# Patient Record
Sex: MALE | Race: WHITE | URBAN - METROPOLITAN AREA
[De-identification: names, ages, dates, MRNs, and addresses within clinical notes are randomized per-mention and may not be internally consistent; named-entity substitution may affect disease eponyms.]

---

## 2017-09-29 ENCOUNTER — APPOINTMENT (OUTPATIENT)
Dept: GENERAL RADIOLOGY | Facility: CLINIC | Age: 67
DRG: 314 | End: 2017-09-29
Attending: EMERGENCY MEDICINE
Payer: MEDICARE

## 2017-09-29 ENCOUNTER — HOSPITAL ENCOUNTER (INPATIENT)
Facility: CLINIC | Age: 67
LOS: 11 days | Discharge: HOME | DRG: 314 | End: 2017-10-10
Attending: EMERGENCY MEDICINE | Admitting: INTERNAL MEDICINE
Payer: MEDICARE

## 2017-09-29 DIAGNOSIS — J15.9 COMMUNITY ACQUIRED BACTERIAL PNEUMONIA: Primary | ICD-10-CM

## 2017-09-29 DIAGNOSIS — I50.21 ACUTE SYSTOLIC CONGESTIVE HEART FAILURE (H): ICD-10-CM

## 2017-09-29 PROBLEM — I50.9 CHF (CONGESTIVE HEART FAILURE) (H): Status: ACTIVE | Noted: 2017-09-29

## 2017-09-29 LAB
ABO + RH BLD: NORMAL
ABO + RH BLD: NORMAL
ALBUMIN SERPL-MCNC: 2.6 G/DL (ref 3.4–5)
ALP SERPL-CCNC: 124 U/L (ref 40–150)
ALT SERPL W P-5'-P-CCNC: 28 U/L (ref 0–70)
ANION GAP SERPL CALCULATED.3IONS-SCNC: 5 MMOL/L (ref 3–14)
AST SERPL W P-5'-P-CCNC: 26 U/L (ref 0–45)
BASE EXCESS BLDV CALC-SCNC: 6.6 MMOL/L
BASOPHILS # BLD AUTO: 0.1 10E9/L (ref 0–0.2)
BASOPHILS NFR BLD AUTO: 0.8 %
BILIRUB SERPL-MCNC: 0.2 MG/DL (ref 0.2–1.3)
BLD GP AB SCN SERPL QL: NORMAL
BLOOD BANK CMNT PATIENT-IMP: NORMAL
BUN SERPL-MCNC: 26 MG/DL (ref 7–30)
CALCIUM SERPL-MCNC: 8.1 MG/DL (ref 8.5–10.1)
CHLORIDE SERPL-SCNC: 106 MMOL/L (ref 94–109)
CO2 SERPL-SCNC: 31 MMOL/L (ref 20–32)
CREAT SERPL-MCNC: 0.93 MG/DL (ref 0.66–1.25)
DIFFERENTIAL METHOD BLD: ABNORMAL
EOSINOPHIL # BLD AUTO: 0.1 10E9/L (ref 0–0.7)
EOSINOPHIL NFR BLD AUTO: 1.1 %
ERYTHROCYTE [DISTWIDTH] IN BLOOD BY AUTOMATED COUNT: 18.1 % (ref 10–15)
GFR SERPL CREATININE-BSD FRML MDRD: 81 ML/MIN/1.7M2
GLUCOSE SERPL-MCNC: 90 MG/DL (ref 70–99)
HCO3 BLDV-SCNC: 33 MMOL/L (ref 21–28)
HCT VFR BLD AUTO: 34.4 % (ref 40–53)
HGB BLD-MCNC: 10.9 G/DL (ref 13.3–17.7)
IMM GRANULOCYTES # BLD: 0.2 10E9/L (ref 0–0.4)
IMM GRANULOCYTES NFR BLD: 1.7 %
LYMPHOCYTES # BLD AUTO: 1.2 10E9/L (ref 0.8–5.3)
LYMPHOCYTES NFR BLD AUTO: 11.8 %
MCH RBC QN AUTO: 30.5 PG (ref 26.5–33)
MCHC RBC AUTO-ENTMCNC: 31.7 G/DL (ref 31.5–36.5)
MCV RBC AUTO: 96 FL (ref 78–100)
MONOCYTES # BLD AUTO: 0.8 10E9/L (ref 0–1.3)
MONOCYTES NFR BLD AUTO: 8.1 %
NEUTROPHILS # BLD AUTO: 7.7 10E9/L (ref 1.6–8.3)
NEUTROPHILS NFR BLD AUTO: 76.5 %
NRBC # BLD AUTO: 0 10*3/UL
NRBC BLD AUTO-RTO: 0 /100
NT-PROBNP SERPL-MCNC: 1929 PG/ML (ref 0–900)
O2/TOTAL GAS SETTING VFR VENT: ABNORMAL %
PCO2 BLDV: 52 MM HG (ref 40–50)
PH BLDV: 7.4 PH (ref 7.32–7.43)
PLATELET # BLD AUTO: 266 10E9/L (ref 150–450)
PO2 BLDV: 25 MM HG (ref 25–47)
POTASSIUM SERPL-SCNC: 4.5 MMOL/L (ref 3.4–5.3)
PROT SERPL-MCNC: 7.5 G/DL (ref 6.8–8.8)
RBC # BLD AUTO: 3.57 10E12/L (ref 4.4–5.9)
SODIUM SERPL-SCNC: 142 MMOL/L (ref 133–144)
SPECIMEN EXP DATE BLD: NORMAL
TROPONIN I SERPL-MCNC: 0.12 UG/L (ref 0–0.04)
WBC # BLD AUTO: 10 10E9/L (ref 4–11)

## 2017-09-29 PROCEDURE — 99285 EMERGENCY DEPT VISIT HI MDM: CPT | Mod: 25

## 2017-09-29 PROCEDURE — 84484 ASSAY OF TROPONIN QUANT: CPT | Performed by: EMERGENCY MEDICINE

## 2017-09-29 PROCEDURE — 85025 COMPLETE CBC W/AUTO DIFF WBC: CPT | Performed by: EMERGENCY MEDICINE

## 2017-09-29 PROCEDURE — 71010 XR CHEST PORT 1 VW: CPT

## 2017-09-29 PROCEDURE — 93005 ELECTROCARDIOGRAM TRACING: CPT

## 2017-09-29 PROCEDURE — 80053 COMPREHEN METABOLIC PANEL: CPT | Performed by: EMERGENCY MEDICINE

## 2017-09-29 PROCEDURE — 82803 BLOOD GASES ANY COMBINATION: CPT | Performed by: EMERGENCY MEDICINE

## 2017-09-29 PROCEDURE — 86900 BLOOD TYPING SEROLOGIC ABO: CPT | Performed by: EMERGENCY MEDICINE

## 2017-09-29 PROCEDURE — 12000007 ZZH R&B INTERMEDIATE

## 2017-09-29 PROCEDURE — 99223 1ST HOSP IP/OBS HIGH 75: CPT | Mod: AI | Performed by: INTERNAL MEDICINE

## 2017-09-29 PROCEDURE — 83880 ASSAY OF NATRIURETIC PEPTIDE: CPT | Performed by: EMERGENCY MEDICINE

## 2017-09-29 PROCEDURE — 40000275 ZZH STATISTIC RCP TIME EA 10 MIN

## 2017-09-29 PROCEDURE — 86901 BLOOD TYPING SEROLOGIC RH(D): CPT | Performed by: EMERGENCY MEDICINE

## 2017-09-29 PROCEDURE — 86850 RBC ANTIBODY SCREEN: CPT | Performed by: EMERGENCY MEDICINE

## 2017-09-29 PROCEDURE — 85379 FIBRIN DEGRADATION QUANT: CPT | Performed by: EMERGENCY MEDICINE

## 2017-09-29 RX ORDER — CETIRIZINE HYDROCHLORIDE 10 MG/1
10 TABLET ORAL DAILY
COMMUNITY

## 2017-09-29 RX ORDER — ONDANSETRON 4 MG/1
4 TABLET, ORALLY DISINTEGRATING ORAL EVERY 6 HOURS PRN
Status: DISCONTINUED | OUTPATIENT
Start: 2017-09-29 | End: 2017-10-10 | Stop reason: HOSPADM

## 2017-09-29 RX ORDER — ALLOPURINOL 100 MG/1
100 TABLET ORAL 2 TIMES DAILY
COMMUNITY

## 2017-09-29 RX ORDER — ONDANSETRON 2 MG/ML
4 INJECTION INTRAMUSCULAR; INTRAVENOUS EVERY 6 HOURS PRN
Status: DISCONTINUED | OUTPATIENT
Start: 2017-09-29 | End: 2017-10-10 | Stop reason: HOSPADM

## 2017-09-29 RX ORDER — ACETAMINOPHEN 325 MG/1
650 TABLET ORAL EVERY 4 HOURS PRN
Status: DISCONTINUED | OUTPATIENT
Start: 2017-09-29 | End: 2017-10-10 | Stop reason: HOSPADM

## 2017-09-29 RX ORDER — ASPIRIN 325 MG
325 TABLET ORAL DAILY
Status: DISCONTINUED | OUTPATIENT
Start: 2017-09-30 | End: 2017-10-10 | Stop reason: HOSPADM

## 2017-09-29 RX ORDER — FUROSEMIDE 40 MG
40 TABLET ORAL EVERY MORNING
COMMUNITY

## 2017-09-29 RX ORDER — EMOLLIENT BASE
CREAM (GRAM) TOPICAL 2 TIMES DAILY
COMMUNITY

## 2017-09-29 RX ORDER — FUROSEMIDE 20 MG
20 TABLET ORAL
COMMUNITY

## 2017-09-29 RX ORDER — POTASSIUM CHLORIDE 750 MG/1
10 TABLET, EXTENDED RELEASE ORAL DAILY
COMMUNITY

## 2017-09-29 RX ORDER — AMOXICILLIN 250 MG
1-2 CAPSULE ORAL 2 TIMES DAILY
Status: DISCONTINUED | OUTPATIENT
Start: 2017-09-30 | End: 2017-10-10 | Stop reason: HOSPADM

## 2017-09-29 RX ORDER — NALOXONE HYDROCHLORIDE 0.4 MG/ML
.1-.4 INJECTION, SOLUTION INTRAMUSCULAR; INTRAVENOUS; SUBCUTANEOUS
Status: DISCONTINUED | OUTPATIENT
Start: 2017-09-29 | End: 2017-10-10 | Stop reason: HOSPADM

## 2017-09-29 RX ORDER — FUROSEMIDE 10 MG/ML
40 INJECTION INTRAMUSCULAR; INTRAVENOUS EVERY 8 HOURS
Status: DISCONTINUED | OUTPATIENT
Start: 2017-09-30 | End: 2017-09-30

## 2017-09-29 RX ORDER — MULTIVITAMIN,THERAPEUTIC
1 TABLET ORAL DAILY
COMMUNITY

## 2017-09-29 RX ORDER — ACETIC ACID 20.65 MG/ML
5 SOLUTION AURICULAR (OTIC) WEEKLY
COMMUNITY

## 2017-09-29 RX ORDER — LEVOTHYROXINE SODIUM 50 UG/1
50 TABLET ORAL DAILY
COMMUNITY

## 2017-09-29 RX ORDER — ACETAMINOPHEN 500 MG
1000 TABLET ORAL 2 TIMES DAILY
COMMUNITY

## 2017-09-29 RX ORDER — POLYETHYLENE GLYCOL 3350 17 G/17G
17 POWDER, FOR SOLUTION ORAL DAILY PRN
Status: DISCONTINUED | OUTPATIENT
Start: 2017-09-29 | End: 2017-10-10 | Stop reason: HOSPADM

## 2017-09-29 ASSESSMENT — ENCOUNTER SYMPTOMS: SHORTNESS OF BREATH: 1

## 2017-09-29 NOTE — LETTER
"Transition Communication Hand-off for Care Transitions to Next Level of Care Provider    Name: Jerald Alonzo  MRN #: [id number]  Primary Care Provider: Orestes Werner  Primary Care MD Name: Dr. Corbin Phelps  Primary Clinic: No address on file  Primary Care Clinic Name: [facility]  Reason for Hospitalization:  Acute systolic congestive heart failure (H) [I50.21]  Admit Date/Time: 9/29/2017  7:51 PM  Discharge Date: ***  Payor Source: Payor: MEDICARE / Plan: MEDICARE / Product Type: Medicare /     Readmission Assessment Measure (QUYEN) Risk Score/category: ***    Plan of Care Goals/Milestone Events:   Patient Concerns: ***   Patient Goals:   Short-term ***   Long-term ***   Medical Goals   Short-term ***   Long-term ***         Reason for Communication Hand-off Referral: {CCREASONCMCTNHANDOFFREFERRALCTS:55017}    Discharge Plan:       Concern for non-adherence with plan of care:   Y/N ***  Discharge Needs Assessment:  Needs       Most Recent Value    Equipment Currently Used at Home walker, rolling    # of Referrals Placed by [facility] External Care Coordination [Discussed CHF Action Plan with sister, Shayna Anand. ]          Already enrolled in Tele-monitoring program and name of program:  ***  Follow-up specialty is recommended: {YES / NO:85616::\"Yes\"}    Follow-up plan:  No future appointments.    Any outstanding tests or procedures:              Key Recommendations: Key Recommendations: Monitor sodium intake. Weigh daily as able; maintain exercise/dancing as able.     Shiloh Guerrero    AVS/Discharge Summary is the source of truth; this is a helpful guide for improved communication of patient story          "

## 2017-09-29 NOTE — IP AVS SNAPSHOT
MRN:[id number]                      After Visit Summary   9/29/2017    Jerald Alonzo    MRN: [id number]           Thank you!     Thank you for choosing [facility] for your care. Our goal is always to provide you with excellent care. Hearing back from our patients is one way we can continue to improve our services. Please take a few minutes to complete the written survey that you may receive in the mail after you visit. If you would like to speak to someone directly about your visit please contact Patient Relations at [phone]. Thank you!          Patient Information     Date Of Birth          1950        Designated Caregiver       Most Recent Value    Caregiver    Will someone help with your care after discharge? yes    Name of designated caregiver Jakob     Phone number of caregiver [phone]    Caregiver address caregiver at pt home       About your hospital stay     You were admitted on:  September 29, 2017 You last received care in the:  [facility]    You were discharged on:  October 10, 2017        Reason for your hospital stay       pneumonia                  Who to Call     For medical emergencies, please call 911.  For non-urgent questions about your medical care, please call your primary care provider or clinic, None          Attending Provider     Provider Specialty    Armand Pollock MD Emergency Medicine    Jareth Andrew MD Internal Medicine       Primary Care Provider    Orestes Werner      After Care Instructions     Activity       Your activity upon discharge: activity as tolerated            Diet       Follow this diet upon discharge: Orders Placed This Encounter      Room Service      Combination Diet Low Saturated Fat Na <2400mg Diet, No Caffeine Diet                  Follow-up Appointments     Follow-up and recommended labs and tests        Follow up with primary care provider, Orestes Werner, within 7  "days                  Pending Results     No orders found from 2017 to 2017.            Statement of Approval     Ordered          10/10/17 0835  I have reviewed and agree with all the recommendations and orders detailed in this document.  EFFECTIVE NOW     Approved and electronically signed by:  Jany Mills MD             Admission Information     Date & Time Department Dept. Phone    [address] [place] [address] Medical Surgical [phone]      Your Vitals Were     Blood Pressure Pulse Temperature Respirations Height Weight    104/53 (BP Location: Left arm) 71 97.4  F (36.3  C) (Axillary) 18 1.473 m (4' 9.99\") 82 kg (180 lb 11.2 oz)    Pulse Oximetry BMI (Body Mass Index)                94% 37.78 kg/m2          MyChart Information     [facility] lets you send messages to your doctor, view your test results, renew your prescriptions, schedule appointments and more. To sign up, go to [email or web address] . Click on \"Log in\" on the left side of the screen, which will take you to the Welcome page. Then click on \"Sign up Now\" on the right side of the page.     You will be asked to enter the access code listed below, as well as some personal information. Please follow the directions to create your username and password.     Your access code is: [email or web address]  Expires: 2018 12:26 PM     Your access code will  in 90 days. If you need help or a new code, please call your [facility] clinic or [phone].        Care EveryWhere ID     This is your Care EveryWhere ID. This could be used by other organizations to access your [facility] medical records  [id number]        Equal Access to Services     [facility] Hospital[facility] : Hadii amelia Gonzáles, elizabet bains, aron leroy . So [facility] [phone].    ATENCIÓN: Si habla español, tiene a [place] disposición servicios gratuitos de asistencia lingüística. Llame al [phone].    We comply with " applicable federal civil rights laws and [place] laws. We do not discriminate on the basis of race, color, national origin, age, disability, sex, sexual orientation, or gender identity.               Review of your medicines      CONTINUE these medicines which have NOT CHANGED        Dose / Directions    * acetaminophen 500 MG Caps        w tablets orall as needed for pain maximum 4000mg in 24 hours   Refills:  0       * acetaminophen 500 MG tablet   Commonly known as:  TYLENOL        Dose:  1000 mg   Take 1,000 mg by mouth 2 times daily   Refills:  0       acetic acid 2 % otic solution   Commonly known as:  VOSOL        Dose:  5 drop   Place 5 drops into both ears once a week On Thursday   Refills:  0       allopurinol 100 MG tablet   Commonly known as:  ZYLOPRIM        Dose:  100 mg   Take 100 mg by mouth 2 times daily   Refills:  0       amoxicillin 500 MG tablet   Commonly known as:  AMOXIL        take 4 capsules by mouth 1 hour before dental appointments( 2000mg)   Refills:  0       cetirizine 10 MG tablet   Commonly known as:  zyrTEC        Dose:  10 mg   Take 10 mg by mouth daily   Refills:  0       cholecalciferol 400 UNIT Tabs tablet   Commonly known as:  vitamin D        Dose:  800 Units   Take 800 Units by mouth daily   Refills:  0       COLACE 100 MG capsule   Generic drug:  docusate sodium        Dose:  200 mg   Take 200 mg by mouth every morning   Refills:  0       emollient cream        Apply topically 2 times daily   Refills:  0       * LASIX 20 MG tablet   Generic drug:  furosemide        Dose:  20 mg   Take 20 mg by mouth In afternoon   Refills:  0       * LASIX 40 MG tablet   Generic drug:  furosemide        Dose:  40 mg   Take 40 mg by mouth every morning   Refills:  0       levofloxacin 500 MG tablet   Commonly known as:  LEVAQUIN   Indication:  Community Acquired Pneumonia   Used for:  Community acquired bacterial pneumonia        Dose:  500 mg   Take 1 tablet (500 mg) by mouth daily for 2  days   Quantity:  2 tablet   Refills:  0       levothyroxine 50 MCG tablet   Commonly known as:  SYNTHROID/LEVOTHROID        Dose:  50 mcg   Take 50 mcg by mouth daily   Refills:  0       minocycline 100 MG capsule   Commonly known as:  MINOCIN/DYNACIN        Dose:  100 mg   Take 100 mg by mouth 2 times daily.   Refills:  0       multivitamin, therapeutic Tabs tablet        Dose:  1 tablet   Take 1 tablet by mouth daily   Refills:  0       nexIUM 40 MG CR capsule   Generic drug:  esomeprazole        1 CAPSULE DAILY   Refills:  0       potassium chloride SA 10 MEQ CR tablet   Commonly known as:  K-DUR/KLOR-CON M        Dose:  10 mEq   Take 10 mEq by mouth daily   Refills:  0       tamsulosin 0.4 MG capsule   Commonly known as:  FLOMAX        Dose:  0.4 mg   Take 0.4 mg by mouth daily.   Refills:  0       TEA TREE OIL        apply thin layer to toenails as needed   Refills:  0       * Notice:  This list has 4 medication(s) that are the same as other medications prescribed for you. Read the directions carefully, and ask your doctor or other care provider to review them with you.         Where to get your medicines      These medications were sent to [facility] - [place], [place] - [id number] [place] [address] [address]  [address] [facility] [address]     Phone:  [phone]     levofloxacin 500 MG tablet               ANTIBIOTIC INSTRUCTION     You've Been Prescribed an Antibiotic - Now What?  Your healthcare team thinks that you or your loved one might have an infection. Some infections can be treated with antibiotics, which are powerful, life-saving drugs. Like all medications, antibiotics have side effects and should only be used when necessary. There are some important things you should know about your antibiotic treatment.      Your healthcare team may run tests before you start taking an antibiotic.    Your team may take samples (e.g., from your blood, urine or other areas) to run tests to look for  bacteria. These test can be important to determine if you need an antibiotic at all and, if you do, which antibiotic will work best.      Within a few days, your healthcare team might change or even stop your antibiotic.    Your team may start you on an antibiotic while they are working to find out what is making you sick.    Your team might change your antibiotic because test results show that a different antibiotic would be better to treat your infection.    In some cases, once your team has more information, they learn that you do not need an antibiotic at all. They may find out that you don't have an infection, or that the antibiotic you're taking won't work against your infection. For example, an infection caused by a virus can't be treated with antibiotics. Staying on an antibiotic when you don't need it is more likely to be harmful than helpful.      You may experience side effects from your antibiotic.    Like all medications, antibiotics have side effects. Some of these can be serious.    Let you healthcare team know if you have any known allergies when you are admitted to the hospital.    One significant side effect of nearly all antibiotics is the risk of severe and sometimes deadly diarrhea caused by Clostridium difficile (C. Difficile). This occurs when a person takes antibiotics because some good germs are destroyed. Antibiotic use allows C. diificile to take over, putting patients at high risk for this serious infection.    As a patient or caregiver, it is important to understand your or your loved one's antibiotic treatment. It is especially important for caregivers to speak up when patients can't speak for themselves. Here are some important questions to ask your healthcare team.    What infection is this antibiotic treating and how do you know I have that infection?    What side effects might occur from this antibiotic?    How long will I need to take this antibiotic?    Is it safe to take this  antibiotic with other medications or supplements (e.g., vitamins) that I am taking?     Are there any special directions I need to know about taking this antibiotic? For example, should I take it with food?    How will I be monitored to know whether my infection is responding to the antibiotic?    What tests may help to make sure the right antibiotic is prescribed for me?      Information provided by:  www.cdc.gov/getsmart  U.S. Department of Health and Human Services  Centers for disease Control and Prevention  National Center for Emerging and Zoonotic Infectious Diseases  Division of Healthcare Quality Promotion         Protect others around you: Learn how to safely use, store and throw away your medicines at www.disposemymeds.org.             Medication List: This is a list of all your medications and when to take them. Check marks below indicate your daily home schedule. Keep this list as a reference.      Medications           Morning Afternoon Evening Bedtime As Needed    * acetaminophen 500 MG Caps   w tablets orall as needed for pain maximum 4000mg in 24 hours                                   * acetaminophen 500 MG tablet   Commonly known as:  TYLENOL   Take 1,000 mg by mouth 2 times daily   Last time this was given:  1,000 mg on 10/10/2017  9:09 AM                                      acetic acid 2 % otic solution   Commonly known as:  VOSOL   Place 5 drops into both ears once a week On Thursday                                allopurinol 100 MG tablet   Commonly known as:  ZYLOPRIM   Take 100 mg by mouth 2 times daily   Last time this was given:  100 mg on 10/10/2017  9:09 AM                                      amoxicillin 500 MG tablet   Commonly known as:  AMOXIL   take 4 capsules by mouth 1 hour before dental appointments( 2000mg)                                cetirizine 10 MG tablet   Commonly known as:  zyrTEC   Take 10 mg by mouth daily   Last time this was given:  10 mg on 10/10/2017  9:08 AM                                    cholecalciferol 400 UNIT Tabs tablet   Commonly known as:  vitamin D   Take 800 Units by mouth daily                                   COLACE 100 MG capsule   Take 200 mg by mouth every morning   Last time this was given:  200 mg on 10/10/2017  9:08 AM   Generic drug:  docusate sodium                                   emollient cream   Apply topically 2 times daily                                      * LASIX 20 MG tablet   Take 20 mg by mouth In afternoon   Last time this was given:  40 mg on 10/10/2017  9:10 AM   Generic drug:  furosemide                                   * LASIX 40 MG tablet   Take 40 mg by mouth every morning   Last time this was given:  40 mg on 10/10/2017  9:10 AM   Generic drug:  furosemide                                   levofloxacin 500 MG tablet   Commonly known as:  LEVAQUIN   Take 1 tablet (500 mg) by mouth daily for 2 days   Last time this was given:  500 mg on 10/10/2017  9:07 AM                                   levothyroxine 50 MCG tablet   Commonly known as:  SYNTHROID/LEVOTHROID   Take 50 mcg by mouth daily   Last time this was given:  50 mcg on 10/10/2017  9:07 AM                                   minocycline 100 MG capsule   Commonly known as:  MINOCIN/DYNACIN   Take 100 mg by mouth 2 times daily.   Last time this was given:  100 mg on 10/10/2017  9:07 AM                                      multivitamin, therapeutic Tabs tablet   Take 1 tablet by mouth daily                                   nexIUM 40 MG CR capsule   1 CAPSULE DAILY   Generic drug:  esomeprazole                                   potassium chloride SA 10 MEQ CR tablet   Commonly known as:  K-DUR/KLOR-CON M   Take 10 mEq by mouth daily                                   tamsulosin 0.4 MG capsule   Commonly known as:  FLOMAX   Take 0.4 mg by mouth daily.   Last time this was given:  0.4 mg on 10/9/2017  8:38 PM                                   TEA TREE OIL   apply thin layer to  toenails as needed                                   * Notice:  This list has 4 medication(s) that are the same as other medications prescribed for you. Read the directions carefully, and ask your doctor or other care provider to review them with you.

## 2017-09-29 NOTE — IP AVS SNAPSHOT
[place] [address] [facility] Surgical    [address]    [facility] [address]    Phone:  [phone]    Fax:  [phone]                                       After Visit Summary   9/29/2017    Jerald Alonzo    MRN: [id number]           After Visit Summary Signature Page     I have received my discharge instructions, and my questions have been answered. I have discussed any challenges I see with this plan with the nurse or doctor.    ..........................................................................................................................................  Patient/Patient Representative Signature      ..........................................................................................................................................  Patient Representative Print Name and Relationship to Patient    ..................................................               ................................................  Date                                            Time    ..........................................................................................................................................  Reviewed by Signature/Title    ...................................................              ..............................................  Date                                                            Time

## 2017-09-29 NOTE — LETTER
Hand-off for Care Transitions to Next Level of Care Provider  Name: Jerald Alonzo  MRN #: [id number]  Reason for Hospitalization:  Acute systolic congestive heart failure (H) [I50.21]  Admit Date/Time: 9/29/2017  7:51 PM  Discharge Date: 10/10     Reason for Communication Hand-off Referral: Admission diagnoses: CHF     Discharge Plan:  Discharged to: Home with homecare                       Key Recommendations:  Pt was admitted with SOB, CHF and severe pulmonary HTN and untreated FROYLAN. He was treated with diuretics although his severe pulmonary HTN was felt to have contributed to his hypoxia. He was treated also with O2, nebs and a trial of steroids. He developed PNA and was on IV abx. Palliative care was involved with his 5 guardians. He was dc'd back to his group home with plans for pt to start hospice.      He has a new POLST given to GH and guardians.     Shiloh Guerrero RN CTS     Handoff was sent via [employer] to cts for pcp

## 2017-09-29 NOTE — Clinical Note
Admitting Physician: BENJAMIN BLAKELY [[id number]]   [id number]   Bed Type: Cardiac Telemetry [44]   Special needs: Confused [4]   Bed request comments: NEEDS LIFT ROOM - BED REQUEST@[id number]

## 2017-09-30 ENCOUNTER — APPOINTMENT (OUTPATIENT)
Dept: ULTRASOUND IMAGING | Facility: CLINIC | Age: 67
DRG: 314 | End: 2017-09-30
Attending: INTERNAL MEDICINE
Payer: MEDICARE

## 2017-09-30 ENCOUNTER — APPOINTMENT (OUTPATIENT)
Dept: CARDIOLOGY | Facility: CLINIC | Age: 67
DRG: 314 | End: 2017-09-30
Attending: INTERNAL MEDICINE
Payer: MEDICARE

## 2017-09-30 LAB
ANION GAP SERPL CALCULATED.3IONS-SCNC: 4 MMOL/L (ref 3–14)
BASOPHILS # BLD AUTO: 0.1 10E9/L (ref 0–0.2)
BASOPHILS NFR BLD AUTO: 1 %
BUN SERPL-MCNC: 25 MG/DL (ref 7–30)
CALCIUM SERPL-MCNC: 7.8 MG/DL (ref 8.5–10.1)
CHLORIDE SERPL-SCNC: 107 MMOL/L (ref 94–109)
CO2 SERPL-SCNC: 31 MMOL/L (ref 20–32)
CREAT SERPL-MCNC: 0.94 MG/DL (ref 0.66–1.25)
D DIMER PPP FEU-MCNC: 1.1 UG/ML FEU (ref 0–0.5)
DIFFERENTIAL METHOD BLD: ABNORMAL
EOSINOPHIL # BLD AUTO: 0.2 10E9/L (ref 0–0.7)
EOSINOPHIL NFR BLD AUTO: 1.8 %
ERYTHROCYTE [DISTWIDTH] IN BLOOD BY AUTOMATED COUNT: 18.2 % (ref 10–15)
GFR SERPL CREATININE-BSD FRML MDRD: 81 ML/MIN/1.7M2
GLUCOSE SERPL-MCNC: 78 MG/DL (ref 70–99)
HCT VFR BLD AUTO: 32.8 % (ref 40–53)
HGB BLD-MCNC: 10 G/DL (ref 13.3–17.7)
IMM GRANULOCYTES # BLD: 0.2 10E9/L (ref 0–0.4)
IMM GRANULOCYTES NFR BLD: 1.8 %
INTERPRETATION ECG - MUSE: NORMAL
LYMPHOCYTES # BLD AUTO: 1.1 10E9/L (ref 0.8–5.3)
LYMPHOCYTES NFR BLD AUTO: 13 %
MCH RBC QN AUTO: 30.4 PG (ref 26.5–33)
MCHC RBC AUTO-ENTMCNC: 30.5 G/DL (ref 31.5–36.5)
MCV RBC AUTO: 100 FL (ref 78–100)
MONOCYTES # BLD AUTO: 0.9 10E9/L (ref 0–1.3)
MONOCYTES NFR BLD AUTO: 9.9 %
NEUTROPHILS # BLD AUTO: 6.3 10E9/L (ref 1.6–8.3)
NEUTROPHILS NFR BLD AUTO: 72.5 %
NRBC # BLD AUTO: 0 10*3/UL
NRBC BLD AUTO-RTO: 0 /100
PLATELET # BLD AUTO: 246 10E9/L (ref 150–450)
POTASSIUM SERPL-SCNC: 4.8 MMOL/L (ref 3.4–5.3)
RBC # BLD AUTO: 3.29 10E12/L (ref 4.4–5.9)
SODIUM SERPL-SCNC: 142 MMOL/L (ref 133–144)
TROPONIN I SERPL-MCNC: 0.05 UG/L (ref 0–0.04)
TROPONIN I SERPL-MCNC: 0.09 UG/L (ref 0–0.04)
WBC # BLD AUTO: 8.7 10E9/L (ref 4–11)

## 2017-09-30 PROCEDURE — 25000132 ZZH RX MED GY IP 250 OP 250 PS 637: Mod: GY | Performed by: INTERNAL MEDICINE

## 2017-09-30 PROCEDURE — 84484 ASSAY OF TROPONIN QUANT: CPT | Performed by: INTERNAL MEDICINE

## 2017-09-30 PROCEDURE — 80048 BASIC METABOLIC PNL TOTAL CA: CPT | Performed by: INTERNAL MEDICINE

## 2017-09-30 PROCEDURE — 85025 COMPLETE CBC W/AUTO DIFF WBC: CPT | Performed by: INTERNAL MEDICINE

## 2017-09-30 PROCEDURE — 12000007 ZZH R&B INTERMEDIATE

## 2017-09-30 PROCEDURE — 99233 SBSQ HOSP IP/OBS HIGH 50: CPT | Performed by: INTERNAL MEDICINE

## 2017-09-30 PROCEDURE — 93306 TTE W/DOPPLER COMPLETE: CPT | Mod: 26 | Performed by: INTERNAL MEDICINE

## 2017-09-30 PROCEDURE — 36415 COLL VENOUS BLD VENIPUNCTURE: CPT | Performed by: INTERNAL MEDICINE

## 2017-09-30 PROCEDURE — 40000915 ZZH STATISTIC SITTER, EVENING HOURS

## 2017-09-30 PROCEDURE — 40000264 ECHO COMPLETE WITH LUMASON

## 2017-09-30 PROCEDURE — 93970 EXTREMITY STUDY: CPT

## 2017-09-30 PROCEDURE — 40000916 ZZH STATISTIC SITTER, NIGHT HOURS

## 2017-09-30 PROCEDURE — 25000128 H RX IP 250 OP 636: Performed by: INTERNAL MEDICINE

## 2017-09-30 PROCEDURE — 25500064 ZZH RX 255 OP 636: Performed by: INTERNAL MEDICINE

## 2017-09-30 PROCEDURE — A9270 NON-COVERED ITEM OR SERVICE: HCPCS | Mod: GY | Performed by: INTERNAL MEDICINE

## 2017-09-30 RX ORDER — POTASSIUM CHLORIDE 750 MG/1
10 TABLET, EXTENDED RELEASE ORAL DAILY
Status: DISCONTINUED | OUTPATIENT
Start: 2017-09-30 | End: 2017-10-10 | Stop reason: HOSPADM

## 2017-09-30 RX ORDER — TAMSULOSIN HYDROCHLORIDE 0.4 MG/1
0.4 CAPSULE ORAL EVERY EVENING
Status: DISCONTINUED | OUTPATIENT
Start: 2017-09-30 | End: 2017-10-10 | Stop reason: HOSPADM

## 2017-09-30 RX ORDER — PANTOPRAZOLE SODIUM 40 MG/1
40 TABLET, DELAYED RELEASE ORAL
Status: DISCONTINUED | OUTPATIENT
Start: 2017-09-30 | End: 2017-10-10 | Stop reason: HOSPADM

## 2017-09-30 RX ORDER — FUROSEMIDE 10 MG/ML
40 INJECTION INTRAMUSCULAR; INTRAVENOUS
Status: DISCONTINUED | OUTPATIENT
Start: 2017-10-01 | End: 2017-10-01

## 2017-09-30 RX ORDER — ACETAMINOPHEN 500 MG
1000 TABLET ORAL 2 TIMES DAILY
Status: DISCONTINUED | OUTPATIENT
Start: 2017-09-30 | End: 2017-10-10 | Stop reason: HOSPADM

## 2017-09-30 RX ORDER — CETIRIZINE HYDROCHLORIDE 10 MG/1
10 TABLET ORAL DAILY
Status: DISCONTINUED | OUTPATIENT
Start: 2017-09-30 | End: 2017-10-10 | Stop reason: HOSPADM

## 2017-09-30 RX ORDER — ALLOPURINOL 100 MG/1
100 TABLET ORAL 2 TIMES DAILY
Status: DISCONTINUED | OUTPATIENT
Start: 2017-09-30 | End: 2017-10-10 | Stop reason: HOSPADM

## 2017-09-30 RX ORDER — MINOCYCLINE HYDROCHLORIDE 100 MG/1
100 CAPSULE ORAL 2 TIMES DAILY
Status: DISCONTINUED | OUTPATIENT
Start: 2017-09-30 | End: 2017-10-10 | Stop reason: HOSPADM

## 2017-09-30 RX ORDER — LEVOTHYROXINE SODIUM 25 UG/1
50 TABLET ORAL DAILY
Status: DISCONTINUED | OUTPATIENT
Start: 2017-09-30 | End: 2017-10-10 | Stop reason: HOSPADM

## 2017-09-30 RX ORDER — DOCUSATE SODIUM 100 MG/1
200 CAPSULE, LIQUID FILLED ORAL EVERY MORNING
Status: DISCONTINUED | OUTPATIENT
Start: 2017-09-30 | End: 2017-10-10 | Stop reason: HOSPADM

## 2017-09-30 RX ADMIN — FUROSEMIDE 40 MG: 10 INJECTION, SOLUTION INTRAVENOUS at 01:55

## 2017-09-30 RX ADMIN — ACETAMINOPHEN 1000 MG: 500 TABLET, FILM COATED ORAL at 08:34

## 2017-09-30 RX ADMIN — LEVOTHYROXINE SODIUM 50 MCG: 25 TABLET ORAL at 08:33

## 2017-09-30 RX ADMIN — SULFUR HEXAFLUORIDE 3 ML: KIT at 09:45

## 2017-09-30 RX ADMIN — ASPIRIN 325 MG ORAL TABLET 325 MG: 325 PILL ORAL at 08:34

## 2017-09-30 RX ADMIN — ALLOPURINOL 100 MG: 100 TABLET ORAL at 08:34

## 2017-09-30 RX ADMIN — MINOCYCLINE HYDROCHLORIDE 100 MG: 100 CAPSULE ORAL at 21:43

## 2017-09-30 RX ADMIN — POTASSIUM CHLORIDE 10 MEQ: 750 TABLET, FILM COATED, EXTENDED RELEASE ORAL at 08:34

## 2017-09-30 RX ADMIN — ACETAMINOPHEN 1000 MG: 500 TABLET, FILM COATED ORAL at 02:50

## 2017-09-30 RX ADMIN — ACETAMINOPHEN 500 MG: 500 TABLET, FILM COATED ORAL at 21:44

## 2017-09-30 RX ADMIN — MINOCYCLINE HYDROCHLORIDE 100 MG: 100 CAPSULE ORAL at 10:08

## 2017-09-30 RX ADMIN — FUROSEMIDE 40 MG: 10 INJECTION, SOLUTION INTRAVENOUS at 10:08

## 2017-09-30 RX ADMIN — PANTOPRAZOLE SODIUM 40 MG: 40 TABLET, DELAYED RELEASE ORAL at 07:00

## 2017-09-30 RX ADMIN — CETIRIZINE HYDROCHLORIDE 10 MG: 10 TABLET, FILM COATED ORAL at 08:32

## 2017-09-30 NOTE — H&P
CHIEF COMPLAINT:  Shortness of breath, hypoxia.      HISTORY OF PRESENT ILLNESS:  The patient Jerald Alonzo is a 66-year-old male with Down syndrome, cognitive dysfunction and nonverbal status.  History is obtained from the chart, Dr. Pollock from the Emergency Department with whom I spoke and a Group Home Staff Member who is here with the patient and knows him well.  Reportedly the patient has been having increasing work of breathing for the past 1 week.  He has chronic lower extremity edema which has been treated with Ace wraps.  He resides in a house with his brother who also has cognitive dysfunction.  Apparently they have a contract with a Group Home, and a Group Home Staff Member stays at the house with them 24 hours a day.  At baseline the patient needs a regular diet.  He has no history of aspiration or choking.  He walks with a walker in the home and uses a wheelchair in public.  He is minimally nonverbal.  He will follow commands if the understands what is being said to him.      In addition to increased work of breathing the Group Home Staff reports that the patient has had decreased oral intake and has appears tired lately.  He also had a loose bowel movement today.  Staff reports no other concerning symptoms.  They have attempted to arrange a sleep study for the patient which is upcoming as sleep apnea is suspected.  The patient apparently has been sleeping upright for almost a decade.      On arrival in the Emergency Department this evening vital signs included a blood pressure 145/55 with a heart rate of 90, temperature 99.3 degrees and O2 saturations 94% on room air.  The patient would not wear a facemask in the Emergency Department, so an Emergency Department staffer is currently holding an oxygen tubing for blow-by oxygen.      EMERGENCY DEPARTMENT LABORATORY AND IMAGING RESULTS of basic laboratory studies and imaging studies:   1.  Hemoglobin 11, white cell count and platelet count normal.      2.  Troponin 0.115.     3.  ProBNP 1,900.     4.  Complete metabolic panel essentially normal except for albumin of 2.6.     5.  Chest x-ray shows interstitial infiltrates and localized area in the right mid lung which could be a consolidation or atelectasis.     6.  EKG in the Emergency Department was personally reviewed by me.  It shows sinus rhythm with a right bundle branch block physiology present, no acute ST changes or pathologic Q-waves.      I confirmed with the Group Home Staff that the patient is DNR/DNI.  Apparently there is paperwork filled out to this effect which Staff will bring in tomorrow.  The patient is being admitted to the Hospitalist Service.  I spoke with Dr. Pollock of the Emergency Department about admitting the patient to the hospital.      PAST MEDICAL HISTORY:   1.  Down syndrome, essentially nonverbal at baseline.     2.  Lower extremity edema.   3.  Back surgery.   4.  Knee replacement.   5.  Concern for possible sleep apnea with an outpatient sleep study arranged, but not yet performed.   6.  Discoloration of skin secondary to chronic minocycline use.      CURRENT MEDICATIONS:  We await Pharmacy reconciliation of medication list, but list appears to include:   1.  Acetaminophen.   2.  Amoxicillin before dental appointments.   3.  Multivitamin.   4.  Docusate.   5.  Emollient cream.   6.  Lasix 40 mg daily.   7.  Minocycline 100 mg twice a day.   8.  Nexium.   9.  Tamsulosin.   10.  Tea tree oil.      DRUG ALLERGIES:  Ertapenem caused swelling.      FAMILY HISTORY:  Unobtainable from this patient.      SOCIAL HISTORY:  Unobtainable from this patient.  Per Group Home Staff Jerald does not smoke or drink alcohol.  His code status is DNR/DNI.  He walks with a walker at home and uses a wheelchair in public.  He eats a regular diet.      REVIEW OF SYSTEMS:  Unobtainable from this patient.      PHYSICAL EXAMINATION:   VITAL SIGNS:  Blood pressure currently 143/55, heart rate 90,  respiratory rate approximately 30, temperature 99.3 degrees and O2 saturations 84% on room air initially, increased to 93% with blow-by oxygen.   GENERAL:  The patient is nonverbal.  He does look at me when I speak to him.  He appears tachypneic and is doing some belly breathing.    HEENT:  Facial appearance is consistent with Down syndrome.  Head is atraumatic and normocephalic.  Sclerae are white.  Eyelids are normal.  Conjunctivae are normal.  Extraocular movements are intact.   NECK:  Supple with no cervical or supraclavicular lymphadenopathy.   HEART:  Regular rate and rhythm, no significant murmurs.  There is 1-2+ lower extremity edema. LUNGS:  Notable for crackles and decreased breath sounds at the bases bilaterally, appears to be doing belly breathing with abdominal retractions and is tachypneic.   ABDOMEN:  Nontender, nondistended, soft, no masses or organomegaly.   EXTREMITIES:  Edema as above, legs currently wrapped in Ace wraps.   SKIN of the face and legs has a bluish cyanotic hue.  Per Group Home Staff this is the patient's usual appearance, and he does not appear more cyanotic than usual.  However, he does appear a bit more pale than usual according to Staff.  Otherwise there is no rash or jaundice, and skin is dry to touch.   NEUROLOGIC:  Cranial nerves II-XII appear to be intact as far as I can determine.  The patient appears to be moving all extremities.   PSYCHIATRIC:  The patient is nonverbal.  He does not appear to be agitated.      LABORATORY AND IMAGING DATA:  Reviewed as above in History of Present Illness.      IMPRESSION:  The patient is a 66-year-old male with a history of Down syndrome, cognitive dysfunction, chronic lower extremity edema and skin discoloration from chronic minocycline use.  He presents to the hospital today with a Group Home Staff Member for concerns regarding increased work of breathing, decreased oral intake and fatigue.  In the Emergency Department the patient was  noted to have hypoxia with significant work of breathing.  He would not allow a facemask to be placed and pulled it off.  Blow-by oxygen is being administered.  Work-up included laboratory findings notable for an elevated ProBNP, mild elevation of troponin and a chest x-ray consistent with pulmonary edema.      1.  Acute hypoxic respiratory failure secondary to heart failure exacerbation.   2.  Heart failure exacerbation, suspect systolic versus diastolic dysfunction.   3.  Mild troponin elevation, suspect demand ischemia in the setting of heart failure exacerbation related to volume overload and increased work of breathing.   4.  Chronic lower extremity edema.   5.  Down syndrome history, nonverbal at baseline, mobilizes with a walker at home and wheelchair in public.   6.  DNR/DNI code status confirmed with Group Home Staff.      PLAN:   1.  Admit to Inpatient status.   2.  Telemetry monitoring.   3.  Lasix 40 mg IV q.8h. as the patient appears to need considerable diuresis.   4.  Echocardiogram will be ordered.   5.  DNR/DNI code status.   6.  Repeat laboratory studies in the morning.   7.  Will get troponin enzymes, but I do not anticipate further work-up unless troponin enzyme elevates considerably.  Even then given the patient's baseline clinical status I do think he would tolerate procedures or cardiac testing beyond get an echocardiogram.   8.  We will attempt to use oxygen as the patient allows.  He has been pulling the facemask off in the Emergency Department which makes this somewhat challenging.   9.  I anticipate a greater-than-2-midnight stay in the hospital for diuresis.   10.  The patient is admitted to Inpatient status.         BENJAMIN BLAKELY MD             D: 2017 22:53   T: 2017 23:45   MT: [id number]      Name:     BETTY SALINAS   MRN:      [id number]        Account:      [id number]   :      1950           Admitted:     [id number]      Document: [id number]

## 2017-09-30 NOTE — ED NOTES
[facility]  ED Nurse Handoff Report    Jerald Alonzo is a 66 year old male   ED Chief complaint: No chief complaint on file.  . ED Diagnosis:   Final diagnoses:   Acute systolic congestive heart failure (H)     Allergies:   Allergies   Allergen Reactions     Ertapenem Swelling       Code Status: DNR / DNI  Activity level - Baseline/Home:  Stand with Assist 1-2. Activity Level - Current:   Stand with Assist 1-2. Lift room needed: Yes. Bariatric: No   Needed: No   Isolation: No. Infection: Not Applicable.     Vital Signs:   Vitals:    09/29/17 1949 09/29/17 2009   BP: 143/55    Pulse: 88    Resp: 28    Temp: 99.3  F (37.4  C)    TempSrc: Temporal    SpO2: (!) 84% 95%   Weight: 90.7 kg (200 lb)        Cardiac Rhythm:  ,      Pain level:    Patient confused: No. Patient Falls Risk: Yes.   Elimination Status: Has voided   Patient Report - Initial Complaint: increased shortness of breath. Focused Assessment: increased abdominal muscle use. Noted to have low oxygen saturation in clinic and was brought to ED for evaluation. Oxygen saturation stable with blow-by oxygen. Unable to tolerate face mask for oxygen. History of down syndrome and CHF and nonverbal. PCA supportive at bedside.  Tests Performed: labs, VBG, CXR. Abnormal Results: ProBNP-1929, trop-0.115. VBG: CO2-52, bicarb-33.   Treatments provided: blow-by oxygen  Family Comments: family aware, PCA at bedside  OBS brochure/video discussed/provided to patient:  N/A  ED Medications:   Medications   aspirin EC EC tablet 325 mg (not administered)     Drips infusing:  No  For the majority of the shift, the patient's behavior Green. Interventions performed were NA.     Severe Sepsis OR Septic Shock Diagnosis Present: No      ED Nurse Name/Phone Number: Lily Singh,   10:50 PM    RECEIVING UNIT ED HANDOFF REVIEW    Above ED Nurse Handoff Report was reviewed: Yes  Reviewed by: Lala Glasgow on September 29, 2017 at 11:28 PM

## 2017-09-30 NOTE — ED NOTES
Pt with low perfusion and low sats @ 86-88%.  Changed O2 sensor to an ear probe and set up blow by O2 and held for pt.  Sats raised to 94%.  Pt resting with caregiver at bedside.

## 2017-09-30 NOTE — ED NOTES
Pt comes from clinic with increased SOB, 78% RA, pt nonverbal, here with PCA. ABC's intact, alert.

## 2017-09-30 NOTE — PROGRESS NOTES
"[facility]  Hospitalist Progress Note  Corbin Hernandez MD 09/30/2017    Reason for Stay (Diagnosis): Shortness of breath         Assessment and Plan:      Summary of Stay: Jerald Alonzo is a 66 year old Down Syndrome male with severe cognitive delay who came to attention on 9/29/2017 with increased work of breathing. He was noted to have bilat infiltrates assoc with measured hypoxia and was started on diuretics for presumed CHF. However I note that Echo does not support LV failure (neither systolic or diastolic).      Problem List:   1. Acute hypoxic resp failure. Currently on treatment for CHF (diuretics and BP reduction).  2. Severe pulmonary hypertension. Likely due to FROYLAN, but also likely worse due to current lung inflammation.   3. Chronic LE edema. Appears very well-managed.       PLAN:  1. Cont with diuretic management.  2. Check Influenza swab.   3. Check procalcitonin. Consider covering pt with antibiotics.   4. Reduce frequency of furosemide to twice daily.   5. I had checked the d-dimer, but ultimately, think that the CXR and Echo are not at all compatible with PE.    DVT Prophylaxis: Enoxaparin (Lovenox) SQ  Code Status: DNR / DNI  Discharge Dispo: back to group home expected.   Estimated Disch Date / # of Days until Disch: TBD        Interval History (Subjective):      Chart reviewed, pt interviewed.      I met with pt's sister, Shayna at the bedside. She wondered if it would be reasonable to transfer pt to [facility], but I think I discouraged that plan. She gives a full history of Dev's life.   Pt appears highly likely to have FROYLAN, based on his facial structure, but he \"would never tolerate the mask\" his sister says.                   Physical Exam:      Last Vital Signs:  /66 (BP Location: Left arm)  Pulse 80  Temp 98.3  F (36.8  C) (Axillary)  Resp 28  Ht 1.473 m (4' 10\")  Wt 88.6 kg (195 lb 6.4 oz)  SpO2 (!) 88%  BMI 40.84 kg/m2    I/O last 3 completed " shifts:  In: 600 [P.O.:600]  Out: 1550 [Urine:1550]    Constitutional: Awake, alert, increased work and rate of breathing. He is interactive with his sister, but less so with me.   Pt is a heavy-set, very short male with Downs syndrome features.   Respiratory: Clear to auscultation bilaterally, no crackles or wheezing   Cardiovascular: Regular rate and rhythm, normal S1 and S2, and no murmur noted   Abdomen: Normal bowel sounds, soft, non-distended, non-tender   Skin: No rashes, no cyanosis, dry to touch   Neuro: Alert and oriented x3, no weakness, numbness, memory loss   Extremities: LEs are wrapped. When I unwrapped them, he has obvious edema that is chronic and well-compressed with the wraps. He also has significant nodular change over his ankles bilat. This is soft and clearly very chronic and clean. Overall, very well cared-for. He also has a lot of bluish discoloration that has been ascribed to chronic Minocycline therapy.   Other(s):        All other systems: Negative          Medications:      All current medications were reviewed with changes reflected in problem list.         Data:      All new lab and imaging data was reviewed.   Labs/Imaging:  Results for orders placed or performed during the hospital encounter of 09/29/17 (from the past 24 hour(s))   Troponin I   Result Value Ref Range    Troponin I ES 0.092 (H) 0.000 - 0.045 ug/L   Troponin I   Result Value Ref Range    Troponin I ES 0.048 (H) 0.000 - 0.045 ug/L   Basic metabolic panel   Result Value Ref Range    Sodium 142 133 - 144 mmol/L    Potassium 4.8 3.4 - 5.3 mmol/L    Chloride 107 94 - 109 mmol/L    Carbon Dioxide 31 20 - 32 mmol/L    Anion Gap 4 3 - 14 mmol/L    Glucose 78 70 - 99 mg/dL    Urea Nitrogen 25 7 - 30 mg/dL    Creatinine 0.94 0.66 - 1.25 mg/dL    GFR Estimate 81 >60 mL/min/1.7m2    GFR Estimate If Black >90 >60 mL/min/1.7m2    Calcium 7.8 (L) 8.5 - 10.1 mg/dL   CBC with platelets differential   Result Value Ref Range    WBC 8.7  4.0 - 11.0 10e9/L    RBC Count 3.29 (L) 4.4 - 5.9 10e12/L    Hemoglobin 10.0 (L) 13.3 - 17.7 g/dL    Hematocrit 32.8 (L) 40.0 - 53.0 %     78 - 100 fl    MCH 30.4 26.5 - 33.0 pg    MCHC 30.5 (L) 31.5 - 36.5 g/dL    RDW 18.2 (H) 10.0 - 15.0 %    Platelet Count 246 150 - 450 10e9/L    Diff Method Automated Method     % Neutrophils 72.5 %    % Lymphocytes 13.0 %    % Monocytes 9.9 %    % Eosinophils 1.8 %    % Basophils 1.0 %    % Immature Granulocytes 1.8 %    Nucleated RBCs 0 0 /100    Absolute Neutrophil 6.3 1.6 - 8.3 10e9/L    Absolute Lymphocytes 1.1 0.8 - 5.3 10e9/L    Absolute Monocytes 0.9 0.0 - 1.3 10e9/L    Absolute Eosinophils 0.2 0.0 - 0.7 10e9/L    Absolute Basophils 0.1 0.0 - 0.2 10e9/L    Abs Immature Granulocytes 0.2 0 - 0.4 10e9/L    Absolute Nucleated RBC 0.0    Echo Complete with Lumason    Narrative    [id number]  [facility]  [id number]  [id number]^ZORA^BENJAMIN^[facility]  Echocardiography Laboratory  [address] [place] [address]        Name: BETTY SALINAS  MRN: [id number]  : 1950  Study Date: 2017 08:32 AM  Age: 66 yrs  Gender: Male  Patient Location: [facility]  Reason For Study: CHF  Ordering Physician: BENJAMIN BLAKELY     BSA: 1.9 m2  Height: 63 in  Weight: 200 lb  HR: 85  BP: 154/106 mmHg  _____________________________________________________________________________  __        Procedure  Complete Echo Adult. Contrast Lumason.  _____________________________________________________________________________  __        Interpretation Summary     The visual ejection fraction is estimated at 60-65%.  Grade I or early diastolic dysfunction.  No regional wall motion abnormalities noted.  Flattened septum is consistent with RV pressure/volume overload.  The right ventricular systolic function is mild to moderately reduced.  The right ventricle is moderately dilated.  Severe (>55mmHg) pulmonary hypertension is present.  There is mild to moderate  (1-2+) tricuspid regurgitation.  _____________________________________________________________________________  __        Left Ventricle  The left ventricle is normal in size. There is normal left ventricular wall  thickness. The visual ejection fraction is estimated at 60-65%. Grade I or  early diastolic dysfunction. No regional wall motion abnormalities noted.  Flattened septum is consistent with RV pressure/volume overload.     Right Ventricle  The right ventricle is moderately dilated. The right ventricular systolic  function is mild to moderately reduced.     Atria  Normal left atrial size. Right atrial size is normal. There is no color  Doppler evidence of an atrial shunt.        Mitral Valve  The mitral valve leaflets are mildly thickened. There is trace mitral  regurgitation.     Tricuspid Valve  There is mild to moderate (1-2+) tricuspid regurgitation. Normal IVC (1.5-  2.5cm) with >50% respiratory collapse; right atrial pressure is estimated at  5-10mmHg. The right ventricular systolic pressure is approximated at 50.6 mmHg  plus the right atrial pressure. Severe (>55mmHg) pulmonary hypertension is  present.     Aortic Valve  There is trivial trileaflet aortic sclerosis. No aortic regurgitation is  present.     Pulmonic Valve  There is trace pulmonic valvular regurgitation.     Vessels  Normal size aorta. The aortic root is normal size.     Pericardium  There is no pericardial effusion.        Rhythm  Sinus rhythm was noted.  _____________________________________________________________________________  __     MMode/2D Measurements & Calculations  IVSd: 1.1 cm  LVIDd: 3.8 cm  LVIDs: 2.2 cm  LVPWd: 1.0 cm  FS: 40.6 %  EDV(Teich): 60.4 ml  ESV(Teich): 16.8 ml  LV mass(C)d: 126.5 grams  LV mass(C)dI: 65.4 grams/m2  Ao root diam: 3.1 cm  LA dimension: 2.8 cm  asc Aorta Diam: 3.1 cm  LA/Ao: 0.92  LA Volume (BP): 29.8 ml  LA Volume Index (BP): 15.4 ml/m2           Doppler Measurements & Calculations  MV E max  elliott: 84.4 cm/sec  MV A max elliott: 87.9 cm/sec  MV E/A: 0.96  MV dec time: 0.24 sec  PA acc time: 0.09 sec  TR max elliott: 355.6 cm/sec  TR max P.6 mmHg  Lateral E/e': 9.7  Medial E/e': 11.4           _____________________________________________________________________________  __           Report approved by: Virginia Reyes 2017 12:11 PM      US Lower Extremity Venous Bilateral Port    Narrative    US LOWER EXTREMITY VENOUS DUPLEX BILATERAL PORT   2017 1:34 PM     HISTORY: Shortness of breath. Evaluate for deep venous thrombosis.    COMPARISON: None.    TECHNIQUE: Spectral doppler and waveform analysis were performed on  the bilateral lower extremity veins.    FINDINGS: The bilateral common femoral, femoral, and popliteal veins  are patent, compressible, and negative for deep venous thrombosis.  Calf veins are segmentally seen but appear negative for DVT where  visualized.      Impression    IMPRESSION:  No evidence for deep venous thrombosis in the bilateral  lower extremity veins.    PEARL CHAVEZ MD   Care Coordinator IP Consult    Narrative    Leticia Helm CM     2017  2:26 PM  Care Transition Initial Assessment - RN    Reason For Consult: care coordination/care conference, discharge   planning   Met with: Patient and Family - sisterShayna. Patient has   Down's Syndrome is has significant cognitive impairment.     DATA   Active Problems:    CHF (congestive heart failure) (H)     Discussed CHF packet with sisterShayna. Provided extra copy   for group home staff.  Cognitive Status: awake, confused and non-verbal.  Primary Care Clinic Name: [facility]  Primary Care MD Name: Dr. oCrbin Phelps  Contact information and PCP information verified: Yes    Lives With: other (see comments) (Group home)     Description of Support System: Supportive, Involved   Who is your support system?: Sibling(s), Facility   resident(s)/Staff       Insurance concerns: No Insurance issues  identified    ASSESSMENT  Patient currently receives the following services:  Full. 24 hour   care.         Identified issues/concerns regarding health management: Monitor   compliance with low sodium diet; get daily weights as able;   maintain exercise/dancing as able.     PLAN  Financial costs for the patient were not discussed.  Patient given options and choices for discharge.  Patient/family is agreeable to the plan?  Yes  Patient anticipates discharging back to group home.      Patient anticipates needs for home equipment: No  Discharge Planner   Discharge Plans in progress: Yes  Barriers to discharge plan: IV diuresis & antibiotics  Plan/Disposition: Home   Appointments: F/U with PCP after discharge. Due to pt living in   group home, family arrange f/u appointment.     CM will continue to follow patient until discharge for any   additional needs.     Felipa Helm RN, BSN, CTS  [facility]  [phone]

## 2017-09-30 NOTE — CONSULTS
Care Transition Initial Assessment - RN    Reason For Consult: care coordination/care conference, discharge planning   Met with: Patient and Family - sisterShayna. Patient has Down's Syndrome is has significant cognitive impairment.     DATA   Active Problems:    CHF (congestive heart failure) (H)     Discussed CHF packet with sisterShayna. Provided extra copy for group home staff.  Cognitive Status: awake, confused and non-verbal.  Primary Care Clinic Name: [facility]  Primary Care MD Name: Dr. Corbin Phelps  Contact information and PCP information verified: Yes    Lives With: other (see comments) (Group home)     Description of Support System: Supportive, Involved   Who is your support system?: Sibling(s), Facility resident(s)/Staff       Insurance concerns: No Insurance issues identified    ASSESSMENT  Patient currently receives the following services:  Full. 24 hour care.         Identified issues/concerns regarding health management: Monitor compliance with low sodium diet; get daily weights as able; maintain exercise/dancing as able.     PLAN  Financial costs for the patient were not discussed.  Patient given options and choices for discharge.  Patient/family is agreeable to the plan?  Yes  Patient anticipates discharging back to group home.      Patient anticipates needs for home equipment: No  Discharge Planner   Discharge Plans in progress: Yes  Barriers to discharge plan: IV diuresis & antibiotics  Plan/Disposition: Home   Appointments: F/U with PCP after discharge. Due to pt living in group home, family arrange f/u appointment.     CM will continue to follow patient until discharge for any additional needs.     Felipa Helm, RN, BSN, CTS  [facility]  [phone]

## 2017-09-30 NOTE — PHARMACY-ADMISSION MEDICATION HISTORY
Admission medication history interview status for this patient is complete. See [facility] admission navigator for allergy information, prior to admission medications and immunization status.     Medication history interview source(s):Patient and Family  Medication history resources (including written lists, pill bottles, clinic record):[facility]  Primary pharmacy:-    Changes made to PTA medication list:  Added: tylenol 1000 mg BID, allopurinol, acetic acid OT, levothyroxine,KDur, mvi, vit d  Deleted: dialyvite  Changed: -    Actions taken by pharmacist (provider contacted, etc):None     Additional medication history information:None    Medication reconciliation/reorder completed by provider prior to medication history? No    Do you take OTC medications (eg tylenol, ibuprofen, fish oil, eye/ear drops, etc)? Yes(Y/N)    For patients on insulin therapy: No (Y/N)  Lantus/levemir/NPH/Mix 70/30 dose:   (Y/N) (see Med list for doses)   Sliding scale Novolog Y/N  If Yes, do you have a baseline novolog pre-meal dose:  units with meals  Patients eat three meals a day:   Y/N    How many episodes of hypoglycemia do you have per week: _______  How many missed doses do you have per week: ______  How many times do you check your blood glucose per day: _______   Any Barriers to therapy - Be specific :  cost of medications, comfortable with giving injections (if applicable), comfortable and confident with current diabetes regimen: Y/N ______________      Prior to Admission medications    Medication Sig Last Dose Taking? Auth Provider   acetaminophen (TYLENOL) 500 MG tablet Take 1,000 mg by mouth 2 times daily 9/29/2017 at x1 Yes Unknown, Entered By History   emollient (VANICREAM) cream Apply topically 2 times daily  Yes Unknown, Entered By History   allopurinol (ZYLOPRIM) 100 MG tablet Take 100 mg by mouth 2 times daily 9/29/2017 at x1 Yes Unknown, Entered By History   furosemide (LASIX) 20 MG tablet Take 20 mg by mouth In afternoon  9/29/2017 at Unknown time Yes Unknown, Entered By History   acetic acid (VOSOL) 2 % otic solution Place 5 drops into both ears once a week On Thursday 9/28/2017 at pm Yes Unknown, Entered By History   levothyroxine (SYNTHROID/LEVOTHROID) 50 MCG tablet Take 50 mcg by mouth daily 9/29/2017 at Unknown time Yes Unknown, Entered By History   furosemide (LASIX) 40 MG tablet Take 40 mg by mouth every morning 9/29/2017 at Unknown time Yes Unknown, Entered By History   potassium chloride SA (K-DUR/KLOR-CON M) 10 MEQ CR tablet Take 10 mEq by mouth daily 9/29/2017 at am Yes Unknown, Entered By History   multivitamin, therapeutic (THERA-VIT) TABS tablet Take 1 tablet by mouth daily 9/29/2017 at am Yes Unknown, Entered By History   cholecalciferol (VITAMIN D) 400 UNIT TABS tablet Take 800 Units by mouth daily 9/29/2017 at am Yes Unknown, Entered By History   cetirizine (ZYRTEC) 10 MG tablet Take 10 mg by mouth daily 9/29/2017 at am Yes Unknown, Entered By History   docusate sodium (COLACE) 100 MG capsule Take 200 mg by mouth every morning  9/29/2017 at Unknown time Yes Reported, Patient   levofloxacin (LEVAQUIN) 500 MG tablet Take 500 mg by mouth daily. 9/29/2017 at Unknown time Yes Reported, Patient   minocycline (MINOCIN,DYNACIN) 100 MG capsule Take 100 mg by mouth 2 times daily. 9/29/2017 at x1 Yes Reported, Patient   tamsulosin (FLOMAX) 0.4 MG 24 hr capsule Take 0.4 mg by mouth daily. 9/28/2017 at pm Yes Reported, Patient   NEXIUM 40 MG OR CPDR 1 CAPSULE DAILY 9/29/2017 at Unknown time Yes Reported, Patient   ACETAMINOPHEN 500 MG OR CAPS w tablets orall as needed for pain maximum 4000mg in 24 hours   Reported, Patient   AMOXICILLIN 500 MG OR TABS take 4 capsules by mouth 1 hour before dental appointments( 2000mg)   Reported, Patient   TEA TREE OIL apply thin layer to toenails as needed   Reported, Patient

## 2017-09-30 NOTE — PLAN OF CARE
Pt. Alert non verbal, tele SR VSS afebrile, seen by Dr. Henrandez, Family at bedside, Labored breathing, sats fluctuating with 02, removes face mask and canula. Receiving 02 via circuit. Echocardiogram completed today EF 60-65%  , legs seen by Dr. Hernandez he ordered a ROXANN of both legs negative for DVT. Repositioned Q2 mepilix over right buttocks. I & O recorded.

## 2017-09-30 NOTE — ED PROVIDER NOTES
History     Chief Complaint:  shortness of breath     HPI HPI is limited due to the patient's inability to give a history    Jerald Alonzo is a 66 year old Down Syndrome male, with a history of lymphedema, who presents with shortness of breath. The patient is currently on Minocycline for his lymphedema with a side effect of skin discoloration. The patient's care giver stated that the patient has had struggles breathing recently which worsened today, prompting them to visit [facility] visit, but then the patient was referred to the ED for further evaluation. Upon arrival, the patient has O2 level of 84%. The patient's care giver notes that there is no history of breathing difficulties with the patient, but will have to undergo sleep apnea testing. The care giver states that the patient does not have cardiac problems.     Allergies:  Ertapenem      Medications:    Colace  Levaquin  Minocycline  Lasix  Flomax  Nexium  Amoxicillin    Past Medical History:    Down Syndrome  Obesity  Osteoarthritis   Bilateral bundle branch block    Past Surgical History:    Left revise knee joint replace  Left total knee arthroplasty  Right total knee arthroplasty  Epigastric hernia repair    Family History:    No past pertinent family history.     Social History:  Presents with his care giver.  Negative for alcohol use.  Negative for tobacco use.   Marital Status:  Single [1]     Review of Systems   Respiratory: Positive for shortness of breath.    All other systems reviewed and are negative.      Physical Exam   First Vitals:  BP: 143/55  Pulse: 88  Heart Rate: 88  Temp: 99.3  F (37.4  C)  Resp: 28  Weight: 90.7 kg (200 lb)  SpO2: (!) 84 %      Physical Exam   Constitutional: He is oriented to person, place, and time. He appears well-developed.   Obese, developmentally delayed, non verbal moans, downs facies     HENT:   Head: Normocephalic and atraumatic.   Right Ear: External ear normal.   Mouth/Throat:  Oropharynx is clear and moist.   Eyes: Conjunctivae and EOM are normal. Pupils are equal, round, and reactive to light.   Neck: Normal range of motion. Neck supple. No JVD present.   Cardiovascular: Normal rate, regular rhythm and normal heart sounds.    Pulmonary/Chest: Effort normal. Tachypnea noted. He has decreased breath sounds in the right lower field and the left lower field.   Abdominal: Soft. Bowel sounds are normal. He exhibits no distension. There is no tenderness. There is no rebound.   Musculoskeletal: Normal range of motion.   Lymphadenopathy:     He has no cervical adenopathy.   Neurological: He is alert and oriented to person, place, and time. He displays normal reflexes. No cranial nerve deficit. He exhibits normal muscle tone. Coordination normal.   Skin: Skin is warm and dry.        Psychiatric: He has a normal mood and affect. His behavior is normal. Judgment normal.       Emergency Department Course   ECG:  Indication: shortness of breath   Time: 2216  Vent. Rate 86 bpm. ND interval 154. QRS duration 110. QT/QTc 382/457. P-R-T axis 56 -31 26. Normal sinus rhythm. Left axis deviation. Right bundle branch block. Abnormal ECG. Read time: 2220     Imaging:  Radiographic findings were communicated with the patient who voiced understanding of the findings.  XR Chest Port 1 view:   1. New interstitial perihilar opacities not present on 10/18/2006.  This could represent CHF.  2. There is some localized opacity in the right midlung which may be  consolidation or atelectasis. As per radiology.      Laboratory:  CBC: WBC: 10.0, HGB: 10.9(L), PLT: 266  CMP: Calcium: 8.1(L), Albumin: 2.6(L) o/w WNL (Creatinine: 0.93)    BNP:1929(H)  Troponin:0.115(H)  Blood gas venous:CO2: 52(H) and Bicarbonate: 33(H)  ABO/Rh: O Pos, Antibody Neg    Interventions:  Aspirin ordered     Emergency Department Course:  Nursing notes and vitals reviewed. I performed an exam of the patient as documented above. EKG was obtained,  findings above.      IV inserted. Medicine administered as documented above. Blood drawn. This was sent to the lab for further testing, results above.    The patient was sent for a chest XR while in the emergency department, findings above.     2207 I rechecked the patient and discussed the results of his workup thus far.     2215  I consulted with Dr. Andrew of the hospitalist services. They are in agreement to accept the patient for admission.    Findings and plan explained to the caregiver who consents to admission. Discussed the patient with Dr. Andrew, who will admit the patient to an observation bed for further monitoring, evaluation, and treatment.       Impression & Plan      Medical Decision Making:  Jerald Alonzo is a 66 year old male who is a poor history due to his non verbal states. Upon arrival, the patient is tachypneic and hypoxic. Chest XR shows mild congestive heart failure. This is a new diagnosis for this patient. Troponin is positive. I cannot rule out MI. The patient is non verbal and I am not sure if he is able to complain of chest pain. Regardless, I have recommended admission for further CHF evaluation in the hospital.     Diagnosis:    ICD-10-CM   1. Acute systolic congestive heart failure (H) I50.21       Disposition:  Admitted to Dr. Andrew    I, Annalisa Don, am serving as a scribe on 9/29/2017 at 7:58 PM to personally document services performed by Armand Pollock MD based on my observations and the provider's statements to me.      Annalisa Don  9/29/2017   [facility] EMERGENCY DEPARTMENT       Armand Pollock MD  09/30/17 [id number]

## 2017-09-30 NOTE — PLAN OF CARE
"Problem: Patient Care Overview  Goal: Plan of Care/Patient Progress Review  Outcome: No Change  Pt. Responds to name, minimal phrases spoken. Caregiver here at bedside. VSS, afebrile, O2 at 7lpm with blow by, sats varied mid 80-mid 90's. Pt. Refused to wear NC, mask, throws them off. Transfers with assist of x2 and walker. Tele SR. IV lasix q 8hrs. Skin discoloration from chronic minocycline use. Wounds to inner thigh from patient \"digging/scratching\" per caregiver. Wound cleansed and mepilex applied. Abdomen reddened, powder applied. Need for WOC consult. Plan for ECHO today.       "

## 2017-09-30 NOTE — PROGRESS NOTES
Discharge Planner   Discharge Plans in progress: Yes  Barriers to discharge plan: IV diuresis, antibiotics  Follow up plan: F/U with PCP       Entered by: Leticia Helm 09/30/2017 2:19 PM

## 2017-10-01 LAB
ALBUMIN SERPL-MCNC: 2.2 G/DL (ref 3.4–5)
ALP SERPL-CCNC: 100 U/L (ref 40–150)
ALT SERPL W P-5'-P-CCNC: 24 U/L (ref 0–70)
ANION GAP SERPL CALCULATED.3IONS-SCNC: 3 MMOL/L (ref 3–14)
AST SERPL W P-5'-P-CCNC: 22 U/L (ref 0–45)
BILIRUB SERPL-MCNC: 0.2 MG/DL (ref 0.2–1.3)
BUN SERPL-MCNC: 25 MG/DL (ref 7–30)
CALCIUM SERPL-MCNC: 7.7 MG/DL (ref 8.5–10.1)
CHLORIDE SERPL-SCNC: 104 MMOL/L (ref 94–109)
CO2 SERPL-SCNC: 33 MMOL/L (ref 20–32)
CREAT SERPL-MCNC: 1.03 MG/DL (ref 0.66–1.25)
ERYTHROCYTE [DISTWIDTH] IN BLOOD BY AUTOMATED COUNT: 17.9 % (ref 10–15)
GFR SERPL CREATININE-BSD FRML MDRD: 72 ML/MIN/1.7M2
GLUCOSE SERPL-MCNC: 74 MG/DL (ref 70–99)
HCT VFR BLD AUTO: 32.7 % (ref 40–53)
HGB BLD-MCNC: 9.9 G/DL (ref 13.3–17.7)
MCH RBC QN AUTO: 30.1 PG (ref 26.5–33)
MCHC RBC AUTO-ENTMCNC: 30.3 G/DL (ref 31.5–36.5)
MCV RBC AUTO: 99 FL (ref 78–100)
PLATELET # BLD AUTO: 240 10E9/L (ref 150–450)
POTASSIUM SERPL-SCNC: 4.4 MMOL/L (ref 3.4–5.3)
PROCALCITONIN SERPL-MCNC: 0.42 NG/ML
PROT SERPL-MCNC: 6.5 G/DL (ref 6.8–8.8)
RBC # BLD AUTO: 3.29 10E12/L (ref 4.4–5.9)
SODIUM SERPL-SCNC: 140 MMOL/L (ref 133–144)
WBC # BLD AUTO: 8.7 10E9/L (ref 4–11)

## 2017-10-01 PROCEDURE — 84145 PROCALCITONIN (PCT): CPT | Performed by: INTERNAL MEDICINE

## 2017-10-01 PROCEDURE — 99233 SBSQ HOSP IP/OBS HIGH 50: CPT | Performed by: INTERNAL MEDICINE

## 2017-10-01 PROCEDURE — 80053 COMPREHEN METABOLIC PANEL: CPT | Performed by: INTERNAL MEDICINE

## 2017-10-01 PROCEDURE — 25000132 ZZH RX MED GY IP 250 OP 250 PS 637: Mod: GY | Performed by: INTERNAL MEDICINE

## 2017-10-01 PROCEDURE — 40000916 ZZH STATISTIC SITTER, NIGHT HOURS

## 2017-10-01 PROCEDURE — 12000007 ZZH R&B INTERMEDIATE

## 2017-10-01 PROCEDURE — 25000128 H RX IP 250 OP 636: Performed by: INTERNAL MEDICINE

## 2017-10-01 PROCEDURE — 40000914 ZZH STATISTIC SITTER, DAY HOURS

## 2017-10-01 PROCEDURE — 36415 COLL VENOUS BLD VENIPUNCTURE: CPT | Performed by: INTERNAL MEDICINE

## 2017-10-01 PROCEDURE — A9270 NON-COVERED ITEM OR SERVICE: HCPCS | Mod: GY | Performed by: INTERNAL MEDICINE

## 2017-10-01 PROCEDURE — 40000915 ZZH STATISTIC SITTER, EVENING HOURS

## 2017-10-01 PROCEDURE — 85027 COMPLETE CBC AUTOMATED: CPT | Performed by: INTERNAL MEDICINE

## 2017-10-01 RX ADMIN — MINOCYCLINE HYDROCHLORIDE 100 MG: 100 CAPSULE ORAL at 20:36

## 2017-10-01 RX ADMIN — SENNOSIDES AND DOCUSATE SODIUM 2 TABLET: 8.6; 5 TABLET ORAL at 20:37

## 2017-10-01 RX ADMIN — ALLOPURINOL 100 MG: 100 TABLET ORAL at 20:37

## 2017-10-01 RX ADMIN — PANTOPRAZOLE SODIUM 40 MG: 40 TABLET, DELAYED RELEASE ORAL at 10:34

## 2017-10-01 RX ADMIN — ASPIRIN 325 MG ORAL TABLET 325 MG: 325 PILL ORAL at 10:49

## 2017-10-01 RX ADMIN — ALLOPURINOL 100 MG: 100 TABLET ORAL at 10:48

## 2017-10-01 RX ADMIN — DOCUSATE SODIUM 200 MG: 100 CAPSULE, LIQUID FILLED ORAL at 10:36

## 2017-10-01 RX ADMIN — SENNOSIDES AND DOCUSATE SODIUM 1 TABLET: 8.6; 5 TABLET ORAL at 10:36

## 2017-10-01 RX ADMIN — LEVOTHYROXINE SODIUM 50 MCG: 25 TABLET ORAL at 10:35

## 2017-10-01 RX ADMIN — ACETAMINOPHEN 1000 MG: 500 TABLET, FILM COATED ORAL at 10:34

## 2017-10-01 RX ADMIN — POTASSIUM CHLORIDE 10 MEQ: 750 TABLET, FILM COATED, EXTENDED RELEASE ORAL at 10:35

## 2017-10-01 RX ADMIN — ACETAMINOPHEN 650 MG: 325 TABLET, FILM COATED ORAL at 03:30

## 2017-10-01 RX ADMIN — TAMSULOSIN HYDROCHLORIDE 0.4 MG: 0.4 CAPSULE ORAL at 20:37

## 2017-10-01 RX ADMIN — ENOXAPARIN SODIUM 30 MG: 30 INJECTION SUBCUTANEOUS at 21:54

## 2017-10-01 RX ADMIN — MINOCYCLINE HYDROCHLORIDE 100 MG: 100 CAPSULE ORAL at 10:35

## 2017-10-01 RX ADMIN — ACETAMINOPHEN 1000 MG: 500 TABLET, FILM COATED ORAL at 20:37

## 2017-10-01 RX ADMIN — ENOXAPARIN SODIUM 30 MG: 30 INJECTION SUBCUTANEOUS at 00:05

## 2017-10-01 RX ADMIN — CETIRIZINE HYDROCHLORIDE 10 MG: 10 TABLET, FILM COATED ORAL at 10:35

## 2017-10-01 NOTE — PLAN OF CARE
"Pt. Alert tele SR, LS clear, uses accessory muscles, 02 sats fluctuate, seen by Dr. Moreno bladder scanned at 11am 199mL, Minocycline pigmentation to lower extremities. Sitter present, family visit.  Mepilex dressing on right lower buttocks/ inner thigh wound  changed small amount of drainage maybe caused by \"Digging and scratching\" per caregiver.  Repositioned every 2 hours. Straight cath was done per bladder scan and pt. Complaining of abdominal pain. Urine output from straight cath 400mL.  "

## 2017-10-01 NOTE — PROGRESS NOTES
[facility]  Hospitalist Progress Note  Corbin Hernandez MD 10/01/2017    Reason for Stay (Diagnosis): Shortness of breath         Assessment and Plan:      Summary of Stay: Jerald Alonzo is a 66 year old Down Syndrome male with severe cognitive delay who came to attention on 9/29/2017 with increased work of breathing. He was noted to have bilat infiltrates assoc with measured hypoxia and was started on diuretics for presumed CHF. However I note that Echo does not support LV failure (neither systolic or diastolic).      Problem List:   1. Acute hypoxic resp failure. Currently on treatment for CHF (diuretics and BP reduction). Pt's echo shows poss early Diastolic dysfunction and preserved LV EF, with normal appearing valves. Infectious eval is negative (WBC and procal are low, no fever or productive cough). I had checked the d-dimer, but ultimately, think that the CXR and Echo are not at all compatible with PE. I am not convinced that pt is significantly different than his baseline.   2. Severe pulmonary hypertension without overt right heart failure. Likely due to FROYLAN, but also possibly worse due to current lung inflammation.   3. Chronic LE edema. Appears very well-managed with compression.       PLAN:  1. I elected to hold diuretics with the mild rise in creat and without persuasive evidence of true volume overload. Will resume tomorrow with Lasix dose of 40 mg daily.  2. D/C Influenza swab.   3. No indication for empiric antibiotics.  4. Minimize oxygen supplementation to see whether pt truly develops important hypoxia. Likely to need supplemental O2 when he is sleeping.     DVT Prophylaxis: Enoxaparin (Lovenox) SQ  Code Status: DNR / DNI  Discharge Dispo: back to group home expected.   Estimated Disch Date / # of Days until Disch: TBD        Interval History (Subjective):      I met with sister, Elayne and a brother at bedside. I also was able to speak with kayli Park at his home and  "Jakob, the provider who had taken Dev to the ED on the date of admission. Review of the hx indicates that Dev had been tired, as he often is at the end of the week. What was remarkable was that he had fallen asleep during dinner.     He was then taken to the  at [facility] (though records are not available from that visit) where labs and a CXR were reportedly done. Apparently, they saw \"fluid on the lungs\" for which he was sent to the ED. In the ED, his Oxygen sat fell as low as high 70's, and he appeared to be working harder to breath than is his usual. Jakob does indicate that the evaluations at the bedside were very brief.                  Physical Exam:      Last Vital Signs:  /67 (BP Location: Left arm)  Pulse 85  Temp 97.4  F (36.3  C) (Axillary)  Resp 28  Ht 1.473 m (4' 10\")  Wt 85.3 kg (188 lb 1.6 oz)  SpO2 93%  BMI 39.31 kg/m2    I/O last 3 completed shifts:  In: 1245 [P.O.:1245]  Out: 850 [Urine:850]    Constitutional: initialy was sleeing soundly and clearly obstructing periodically. He subsequently was easily wakened, and was alert, with paradoxical motion of the abdomen.   Pt is a heavy-set, very short male with Down syndrome features.   Respiratory: Clear to auscultation bilaterally, no crackles or wheezing   Cardiovascular: Regular rate and rhythm, normal S1 and S2, and no murmur noted   Abdomen: Normal bowel sounds, soft, non-distended, non-tender   Skin: No rashes, no cyanosis, dry to touch   Neuro: Alert and oriented x3, no weakness, numbness, memory loss   Extremities: LEs are wrapped. When I unwrapped them, he has obvious edema that is chronic and well-compressed with the wraps. He also has significant nodular change over his ankles bilat. This is soft and clearly very chronic and clean. Overall, very well cared-for. He also has a lot of bluish discoloration that has been ascribed to chronic Minocycline therapy.   Other(s):        All other systems: Negative          " Medications:      All current medications were reviewed with changes reflected in problem list.         Data:      All new lab and imaging data was reviewed.   Labs/Imaging:  Results for orders placed or performed during the hospital encounter of 09/29/17 (from the past 24 hour(s))   Procalcitonin   Result Value Ref Range    Procalcitonin 0.42 ng/ml   Comprehensive metabolic panel   Result Value Ref Range    Sodium 140 133 - 144 mmol/L    Potassium 4.4 3.4 - 5.3 mmol/L    Chloride 104 94 - 109 mmol/L    Carbon Dioxide 33 (H) 20 - 32 mmol/L    Anion Gap 3 3 - 14 mmol/L    Glucose 74 70 - 99 mg/dL    Urea Nitrogen 25 7 - 30 mg/dL    Creatinine 1.03 0.66 - 1.25 mg/dL    GFR Estimate 72 >60 mL/min/1.7m2    GFR Estimate If Black 88 >60 mL/min/1.7m2    Calcium 7.7 (L) 8.5 - 10.1 mg/dL    Bilirubin Total 0.2 0.2 - 1.3 mg/dL    Albumin 2.2 (L) 3.4 - 5.0 g/dL    Protein Total 6.5 (L) 6.8 - 8.8 g/dL    Alkaline Phosphatase 100 40 - 150 U/L    ALT 24 0 - 70 U/L    AST 22 0 - 45 U/L   CBC with platelets   Result Value Ref Range    WBC 8.7 4.0 - 11.0 10e9/L    RBC Count 3.29 (L) 4.4 - 5.9 10e12/L    Hemoglobin 9.9 (L) 13.3 - 17.7 g/dL    Hematocrit 32.7 (L) 40.0 - 53.0 %    MCV 99 78 - 100 fl    MCH 30.1 26.5 - 33.0 pg    MCHC 30.3 (L) 31.5 - 36.5 g/dL    RDW 17.9 (H) 10.0 - 15.0 %    Platelet Count 240 150 - 450 10e9/L

## 2017-10-01 NOTE — PLAN OF CARE
Problem: Patient Care Overview  Goal: Plan of Care/Patient Progress Review  Outcome: No Change  Pt alert, KATHIE orientation as pt is nonverbal. VSS ex 9L O2 via tube d/t pt ref oxymask and NC - sats 88-92%. LS diminished/clear, BS audible - BM 9/29. Up A2-3 with walker, cardiac diet. Tele - SR.

## 2017-10-01 NOTE — PLAN OF CARE
T max 100.2, RR 28, O2 80's to 90's on 12L blow by O2.  Tele: SR.  Moves with A2-3 and a lift.  Bladder scan and intermittent cath this shift x1.  PRN Tylenol given x1.  D/c back to group home tbd.  PSC at bedside.  Will continue with POC.

## 2017-10-02 LAB
ANION GAP SERPL CALCULATED.3IONS-SCNC: 5 MMOL/L (ref 3–14)
BUN SERPL-MCNC: 20 MG/DL (ref 7–30)
CALCIUM SERPL-MCNC: 8 MG/DL (ref 8.5–10.1)
CHLORIDE SERPL-SCNC: 104 MMOL/L (ref 94–109)
CO2 SERPL-SCNC: 31 MMOL/L (ref 20–32)
CREAT SERPL-MCNC: 0.86 MG/DL (ref 0.66–1.25)
GFR SERPL CREATININE-BSD FRML MDRD: 89 ML/MIN/1.7M2
GLUCOSE SERPL-MCNC: 87 MG/DL (ref 70–99)
LACTATE BLD-SCNC: 1.2 MMOL/L (ref 0.7–2)
NT-PROBNP SERPL-MCNC: 1991 PG/ML (ref 0–900)
POTASSIUM SERPL-SCNC: 4.6 MMOL/L (ref 3.4–5.3)
SODIUM SERPL-SCNC: 140 MMOL/L (ref 133–144)

## 2017-10-02 PROCEDURE — 12000007 ZZH R&B INTERMEDIATE

## 2017-10-02 PROCEDURE — 25000128 H RX IP 250 OP 636: Performed by: INTERNAL MEDICINE

## 2017-10-02 PROCEDURE — 40000914 ZZH STATISTIC SITTER, DAY HOURS

## 2017-10-02 PROCEDURE — 40000915 ZZH STATISTIC SITTER, EVENING HOURS

## 2017-10-02 PROCEDURE — 25000132 ZZH RX MED GY IP 250 OP 250 PS 637: Mod: GY | Performed by: INTERNAL MEDICINE

## 2017-10-02 PROCEDURE — 36415 COLL VENOUS BLD VENIPUNCTURE: CPT | Performed by: INTERNAL MEDICINE

## 2017-10-02 PROCEDURE — 83880 ASSAY OF NATRIURETIC PEPTIDE: CPT | Performed by: INTERNAL MEDICINE

## 2017-10-02 PROCEDURE — 80048 BASIC METABOLIC PNL TOTAL CA: CPT | Performed by: INTERNAL MEDICINE

## 2017-10-02 PROCEDURE — 83605 ASSAY OF LACTIC ACID: CPT | Performed by: INTERNAL MEDICINE

## 2017-10-02 PROCEDURE — 99232 SBSQ HOSP IP/OBS MODERATE 35: CPT | Performed by: INTERNAL MEDICINE

## 2017-10-02 PROCEDURE — 40000916 ZZH STATISTIC SITTER, NIGHT HOURS

## 2017-10-02 PROCEDURE — A9270 NON-COVERED ITEM OR SERVICE: HCPCS | Mod: GY | Performed by: INTERNAL MEDICINE

## 2017-10-02 RX ORDER — FUROSEMIDE 40 MG
40 TABLET ORAL DAILY
Status: DISCONTINUED | OUTPATIENT
Start: 2017-10-03 | End: 2017-10-10 | Stop reason: HOSPADM

## 2017-10-02 RX ADMIN — ACETAMINOPHEN 650 MG: 325 TABLET, FILM COATED ORAL at 14:07

## 2017-10-02 RX ADMIN — CETIRIZINE HYDROCHLORIDE 10 MG: 10 TABLET, FILM COATED ORAL at 08:32

## 2017-10-02 RX ADMIN — ACETAMINOPHEN 1000 MG: 500 TABLET, FILM COATED ORAL at 20:09

## 2017-10-02 RX ADMIN — MINOCYCLINE HYDROCHLORIDE 100 MG: 100 CAPSULE ORAL at 08:32

## 2017-10-02 RX ADMIN — PANTOPRAZOLE SODIUM 40 MG: 40 TABLET, DELAYED RELEASE ORAL at 07:10

## 2017-10-02 RX ADMIN — ALLOPURINOL 100 MG: 100 TABLET ORAL at 08:32

## 2017-10-02 RX ADMIN — ACETAMINOPHEN 1000 MG: 500 TABLET, FILM COATED ORAL at 08:32

## 2017-10-02 RX ADMIN — SENNOSIDES AND DOCUSATE SODIUM 1 TABLET: 8.6; 5 TABLET ORAL at 20:09

## 2017-10-02 RX ADMIN — MINOCYCLINE HYDROCHLORIDE 100 MG: 100 CAPSULE ORAL at 20:09

## 2017-10-02 RX ADMIN — TAMSULOSIN HYDROCHLORIDE 0.4 MG: 0.4 CAPSULE ORAL at 20:09

## 2017-10-02 RX ADMIN — POTASSIUM CHLORIDE 10 MEQ: 750 TABLET, FILM COATED, EXTENDED RELEASE ORAL at 08:32

## 2017-10-02 RX ADMIN — LEVOTHYROXINE SODIUM 50 MCG: 25 TABLET ORAL at 08:32

## 2017-10-02 RX ADMIN — ENOXAPARIN SODIUM 40 MG: 40 INJECTION SUBCUTANEOUS at 20:09

## 2017-10-02 RX ADMIN — ASPIRIN 325 MG ORAL TABLET 325 MG: 325 PILL ORAL at 08:32

## 2017-10-02 RX ADMIN — ALLOPURINOL 100 MG: 100 TABLET ORAL at 20:09

## 2017-10-02 NOTE — PLAN OF CARE
Problem: Patient Care Overview  Goal: Plan of Care/Patient Progress Review  Outcome: No Change  Pt alert, KATHIE orientation d/t pt being nonverbal. Able to make needs known by smiling to yes or no questions. VSS, LS diminished, BS audible - BM 10/1. Up A2 with lift, low fat diet. Refused meal this shift. Repositioned q2hrs. PSC at bedside. Tele - SR.

## 2017-10-02 NOTE — PROGRESS NOTES
Care Coordination:  Request for CC/SW to speak to  staff. Met with pt and group home staff regarding pt plan of care. Pt is a resident at a [facility]. Their phone number is [phone], fax# [phone]. Aruna Urias LPN (cell [phone]) is the head nurse at the  and is the main contact. She would like to be contacted a day before dc to discuss plan of care. She feels pt is back to his baseline and anticipates he will return to the  on dc.     They would like any new scripts for dc sent to their pharmacy to be filled. [facility]: p [phone], f [phone].    They will transport pt back to  on dc once Aruna approves the DC.     Shiloh Guerrero RN CTS

## 2017-10-02 NOTE — PROGRESS NOTES
Care Coordination:  Per Dr Hernandez, pt is ready for dc back to the . He may possibly need home oxygen overnight if he will wear it or can use blow by oxygen.  looking into licensing for home oxygen and will need to train staff in this. Anticipate dc back tomorrow.    Shiloh Guerrero RN CTS

## 2017-10-02 NOTE — PLAN OF CARE
Problem: Cardiac: Heart Failure (Adult)  Goal: Signs and Symptoms of Listed Potential Problems Will be Absent, Minimized or Managed (Cardiac: Heart Failure)  Signs and symptoms of listed potential problems will be absent, minimized or managed by discharge/transition of care (reference Cardiac: Heart Failure (Adult) CPG).   VSS. LS diminished. PCS at bedside. Blow by oxygen 10 LPM Sats running high 80s - low 90s. Pt. Nonverbal. Scratches to L thigh, scabbing. Shifts self frequently, mechanical lift to boost, doesn't tolerate side lying d/t breathing. Will continue to monitor.

## 2017-10-02 NOTE — PROGRESS NOTES
SPIRITUAL HEALTH SERVICES Progress Note  [facility]    Blayne was eating his dinner when I walked into his room.  I spoke to his sitter who stated that he is non-verbal.  Blayne's brother came into the room and he talked to us about Blayne's health.  His brother was happy that he was eating again and stated Blayne must be feeling better.   I spoke with Blayne's brother about where Blayne stays and he shared with me a bit about his own family and the group home that Blayne lives at.    They are hoping that Blayne will be leaving tomorrow.  [facility] will continue to be available per Pt/Family/Staff request.    Krys Blakely   Intern  [phone]

## 2017-10-02 NOTE — PLAN OF CARE
All VSS, LS dim and satting in the low 90's on blow by O2.  Tele: SR.  BSC at bedside.  Plan is to start lasix BID today.  D/c is TBD back to group home.  Will continue with POC.

## 2017-10-03 ENCOUNTER — APPOINTMENT (OUTPATIENT)
Dept: GENERAL RADIOLOGY | Facility: CLINIC | Age: 67
DRG: 314 | End: 2017-10-03
Attending: INTERNAL MEDICINE
Payer: MEDICARE

## 2017-10-03 ENCOUNTER — APPOINTMENT (OUTPATIENT)
Dept: SPEECH THERAPY | Facility: CLINIC | Age: 67
DRG: 314 | End: 2017-10-03
Attending: INTERNAL MEDICINE
Payer: MEDICARE

## 2017-10-03 LAB
ANION GAP SERPL CALCULATED.3IONS-SCNC: 1 MMOL/L (ref 3–14)
BASE EXCESS BLDV CALC-SCNC: 7.6 MMOL/L
BUN SERPL-MCNC: 18 MG/DL (ref 7–30)
CALCIUM SERPL-MCNC: 8.1 MG/DL (ref 8.5–10.1)
CHLORIDE SERPL-SCNC: 106 MMOL/L (ref 94–109)
CO2 SERPL-SCNC: 33 MMOL/L (ref 20–32)
CREAT SERPL-MCNC: 0.82 MG/DL (ref 0.66–1.25)
GFR SERPL CREATININE-BSD FRML MDRD: >90 ML/MIN/1.7M2
GLUCOSE SERPL-MCNC: 79 MG/DL (ref 70–99)
HCO3 BLDV-SCNC: 35 MMOL/L (ref 21–28)
O2/TOTAL GAS SETTING VFR VENT: ABNORMAL %
OXYHGB MFR BLDV: 48 %
PCO2 BLDV: 62 MM HG (ref 40–50)
PH BLDV: 7.36 PH (ref 7.32–7.43)
PLATELET # BLD AUTO: 269 10E9/L (ref 150–450)
PO2 BLDV: 30 MM HG (ref 25–47)
POTASSIUM SERPL-SCNC: 4.8 MMOL/L (ref 3.4–5.3)
PROCALCITONIN SERPL-MCNC: 0.59 NG/ML
SODIUM SERPL-SCNC: 140 MMOL/L (ref 133–144)

## 2017-10-03 PROCEDURE — 12000007 ZZH R&B INTERMEDIATE

## 2017-10-03 PROCEDURE — 25000128 H RX IP 250 OP 636: Performed by: INTERNAL MEDICINE

## 2017-10-03 PROCEDURE — 36415 COLL VENOUS BLD VENIPUNCTURE: CPT | Performed by: INTERNAL MEDICINE

## 2017-10-03 PROCEDURE — A9270 NON-COVERED ITEM OR SERVICE: HCPCS | Mod: GY | Performed by: INTERNAL MEDICINE

## 2017-10-03 PROCEDURE — 85049 AUTOMATED PLATELET COUNT: CPT | Performed by: INTERNAL MEDICINE

## 2017-10-03 PROCEDURE — 94640 AIRWAY INHALATION TREATMENT: CPT | Mod: 76

## 2017-10-03 PROCEDURE — 40000915 ZZH STATISTIC SITTER, EVENING HOURS

## 2017-10-03 PROCEDURE — 99233 SBSQ HOSP IP/OBS HIGH 50: CPT | Performed by: INTERNAL MEDICINE

## 2017-10-03 PROCEDURE — 40000274 ZZH STATISTIC RCP CONSULT EA 30 MIN

## 2017-10-03 PROCEDURE — 40000916 ZZH STATISTIC SITTER, NIGHT HOURS

## 2017-10-03 PROCEDURE — 94640 AIRWAY INHALATION TREATMENT: CPT

## 2017-10-03 PROCEDURE — 82805 BLOOD GASES W/O2 SATURATION: CPT | Performed by: INTERNAL MEDICINE

## 2017-10-03 PROCEDURE — 25000125 ZZHC RX 250: Performed by: INTERNAL MEDICINE

## 2017-10-03 PROCEDURE — 84145 PROCALCITONIN (PCT): CPT | Performed by: INTERNAL MEDICINE

## 2017-10-03 PROCEDURE — 92610 EVALUATE SWALLOWING FUNCTION: CPT | Mod: GN

## 2017-10-03 PROCEDURE — 40000275 ZZH STATISTIC RCP TIME EA 10 MIN

## 2017-10-03 PROCEDURE — 25000132 ZZH RX MED GY IP 250 OP 250 PS 637: Mod: GY | Performed by: INTERNAL MEDICINE

## 2017-10-03 PROCEDURE — 40000914 ZZH STATISTIC SITTER, DAY HOURS

## 2017-10-03 PROCEDURE — 80048 BASIC METABOLIC PNL TOTAL CA: CPT | Performed by: INTERNAL MEDICINE

## 2017-10-03 PROCEDURE — 71010 XR CHEST PORT 1 VW: CPT

## 2017-10-03 PROCEDURE — 40000225 ZZH STATISTIC SLP WARD VISIT

## 2017-10-03 RX ORDER — IPRATROPIUM BROMIDE AND ALBUTEROL SULFATE 2.5; .5 MG/3ML; MG/3ML
3 SOLUTION RESPIRATORY (INHALATION)
Status: DISCONTINUED | OUTPATIENT
Start: 2017-10-03 | End: 2017-10-03

## 2017-10-03 RX ORDER — IPRATROPIUM BROMIDE AND ALBUTEROL SULFATE 2.5; .5 MG/3ML; MG/3ML
3 SOLUTION RESPIRATORY (INHALATION) EVERY 4 HOURS
Status: DISCONTINUED | OUTPATIENT
Start: 2017-10-03 | End: 2017-10-04

## 2017-10-03 RX ORDER — ALBUTEROL SULFATE 0.83 MG/ML
2.5 SOLUTION RESPIRATORY (INHALATION) EVERY 6 HOURS PRN
Status: DISCONTINUED | OUTPATIENT
Start: 2017-10-03 | End: 2017-10-10 | Stop reason: HOSPADM

## 2017-10-03 RX ORDER — METHYLPREDNISOLONE SODIUM SUCCINATE 40 MG/ML
40 INJECTION, POWDER, LYOPHILIZED, FOR SOLUTION INTRAMUSCULAR; INTRAVENOUS DAILY
Status: DISCONTINUED | OUTPATIENT
Start: 2017-10-03 | End: 2017-10-04

## 2017-10-03 RX ADMIN — IPRATROPIUM BROMIDE AND ALBUTEROL SULFATE 3 ML: .5; 3 SOLUTION RESPIRATORY (INHALATION) at 23:52

## 2017-10-03 RX ADMIN — IPRATROPIUM BROMIDE AND ALBUTEROL SULFATE 3 ML: .5; 3 SOLUTION RESPIRATORY (INHALATION) at 19:55

## 2017-10-03 RX ADMIN — ALLOPURINOL 100 MG: 100 TABLET ORAL at 21:54

## 2017-10-03 RX ADMIN — ALLOPURINOL 100 MG: 100 TABLET ORAL at 08:48

## 2017-10-03 RX ADMIN — MINOCYCLINE HYDROCHLORIDE 100 MG: 100 CAPSULE ORAL at 21:53

## 2017-10-03 RX ADMIN — MINOCYCLINE HYDROCHLORIDE 100 MG: 100 CAPSULE ORAL at 08:47

## 2017-10-03 RX ADMIN — PANTOPRAZOLE SODIUM 40 MG: 40 TABLET, DELAYED RELEASE ORAL at 08:47

## 2017-10-03 RX ADMIN — FUROSEMIDE 40 MG: 40 TABLET ORAL at 08:47

## 2017-10-03 RX ADMIN — LEVOTHYROXINE SODIUM 50 MCG: 25 TABLET ORAL at 08:47

## 2017-10-03 RX ADMIN — ACETAMINOPHEN 1000 MG: 500 TABLET, FILM COATED ORAL at 08:48

## 2017-10-03 RX ADMIN — DOCUSATE SODIUM 200 MG: 100 CAPSULE, LIQUID FILLED ORAL at 08:47

## 2017-10-03 RX ADMIN — ENOXAPARIN SODIUM 40 MG: 40 INJECTION SUBCUTANEOUS at 22:00

## 2017-10-03 RX ADMIN — SENNOSIDES AND DOCUSATE SODIUM 1 TABLET: 8.6; 5 TABLET ORAL at 21:54

## 2017-10-03 RX ADMIN — SENNOSIDES AND DOCUSATE SODIUM 1 TABLET: 8.6; 5 TABLET ORAL at 08:48

## 2017-10-03 RX ADMIN — METHYLPREDNISOLONE SODIUM SUCCINATE 40 MG: 40 INJECTION, POWDER, FOR SOLUTION INTRAMUSCULAR; INTRAVENOUS at 13:07

## 2017-10-03 RX ADMIN — POTASSIUM CHLORIDE 10 MEQ: 750 TABLET, FILM COATED, EXTENDED RELEASE ORAL at 08:55

## 2017-10-03 RX ADMIN — ASPIRIN 325 MG ORAL TABLET 325 MG: 325 PILL ORAL at 08:48

## 2017-10-03 RX ADMIN — CETIRIZINE HYDROCHLORIDE 10 MG: 10 TABLET, FILM COATED ORAL at 08:48

## 2017-10-03 RX ADMIN — IPRATROPIUM BROMIDE AND ALBUTEROL SULFATE 3 ML: .5; 3 SOLUTION RESPIRATORY (INHALATION) at 16:35

## 2017-10-03 RX ADMIN — ACETAMINOPHEN 1000 MG: 500 TABLET, FILM COATED ORAL at 21:54

## 2017-10-03 RX ADMIN — IPRATROPIUM BROMIDE AND ALBUTEROL SULFATE 3 ML: .5; 3 SOLUTION RESPIRATORY (INHALATION) at 12:07

## 2017-10-03 RX ADMIN — TAMSULOSIN HYDROCHLORIDE 0.4 MG: 0.4 CAPSULE ORAL at 21:48

## 2017-10-03 NOTE — PROGRESS NOTES
[facility]  Hospitalist Progress Note  Romero Schofield MD 10/03/2017    Reason for Stay (Diagnosis): Shortness of breath         Assessment and Plan:      Summary of Stay:  Jerald Alonzo is a 66 year old Down Syndrome male with severe cognitive delay who came to attention on 9/29/2017 with increased work of breathing. He was noted to have bilateral infiltrates assoc with measured hypoxia and was started on diuretics for presumed CHF. However, Echo does not support LV failure (neither systolic or diastolic) and it is suspected that severe pulmonary hypertension may be the cause of his hypoxia as he is felt to likely have untreated FROYLAN.  He is not tolerant of procedures which limits our ability to extend his workup.  He has been afebrile.  He had increased work of breathing 10/3 with some CO2 retention prompting IV steroids/neb treatments.  Procal is mildly elevated but he has been afebrile and so far not treated for pneumonia.  The approach taken so far is blow-by O2 (does not tolerate O2 mask).       Problem List:   1. Acute hypoxic resp failure.  Pt's echo shows poss early Diastolic dysfunction and preserved LV EF, with normal appearing valves. Infectious eval is not convincing (WBC normal, and procal 0.42, no fever or productive cough). My colleague had checked the d-dimer, but LE US was negative and CXR doesn't seem compatible w/ PE.  Also, it is suspected that much of what we are seeing is chronic.  --continue nebs, daily 40 mg IV solu-medrol given some CO2 retention  --overnight oximetry to try to qualify him for home O2 as he likely has untreated FROYLAN and at minimum is certainly hypoxic.  --recheck procal  --monitor for fever    2. Severe pulmonary hypertension without overt right heart failure identified on echo. Likely due to FROYLAN, but also possibly worse due to current lung inflammation. It is likely that this accounts for much of pt's hypoxia. I am not convinced that further  "diuretics will help much. I suspect that he has quite severe FROYLAN, and may tolerate oxygen blow-by at bedtime, if this is covered by insurance and can be done at his home.     3. Chronic LE edema. Appears very well-managed with compression.     4.  Down's syndrome w/ severe cognitive impairment      DVT Prophylaxis: Enoxaparin (Lovenox) SQ  Code Status: DNR / DNI  Discharge Dispo: back to group home expected.   Estimated Disch Date / # of Days until Disch: likely tomorrow, either with or without supplemental O2.        Interval History (Subjective):      Increased work of breathing this AM, CO2 slightly up  Trial of IV steroids, nebs  He is bothered a lot by interventions and any tubes/lines, etc.                   Physical Exam:      Last Vital Signs:  /59  Pulse 75  Temp 96.1  F (35.6  C) (Axillary)  Resp 24  Ht 1.473 m (4' 10\")  Wt 89.4 kg (197 lb 3.2 oz)  SpO2 96%  BMI 41.21 kg/m2    I/O last 3 completed shifts:  In: 30 [P.O.:30]  Out: 250 [Urine:250]    Constitutional: Alert and interactive. Appears cheerful.  Pt is a heavy-set, very short male with Down syndrome features.   Respiratory: Some increased work of breathing.  Mild bilat dry crackles.     Cardiovascular: Regular rate and rhythm, normal S1 and S2, and no murmur noted   Abdomen: Normal bowel sounds, soft, non-distended, non-tender   Skin: No rashes, no cyanosis, dry to touch   Neuro: Alert and oriented x3, no weakness, numbness, memory loss   Extremities: LEs are wrapped. When I unwrapped them, he has obvious edema that is chronic and well-compressed with the wraps. He also has significant woody plaque-like change over the skin of his ankles bilat. This is soft and clearly very chronic and clean. Very well cared-for. He also has a lot of bluish discoloration that has been ascribed to chronic.   Other(s):        All other systems: Negative          Medications:      All current medications were reviewed with changes reflected in problem " list.         Data:      All new lab and imaging data was reviewed.   Labs/Imaging:  Results for orders placed or performed during the hospital encounter of 09/29/17 (from the past 24 hour(s))   Platelet count   Result Value Ref Range    Platelet Count 269 150 - 450 10e9/L   Basic metabolic panel   Result Value Ref Range    Sodium 140 133 - 144 mmol/L    Potassium 4.8 3.4 - 5.3 mmol/L    Chloride 106 94 - 109 mmol/L    Carbon Dioxide 33 (H) 20 - 32 mmol/L    Anion Gap 1 (L) 3 - 14 mmol/L    Glucose 79 70 - 99 mg/dL    Urea Nitrogen 18 7 - 30 mg/dL    Creatinine 0.82 0.66 - 1.25 mg/dL    GFR Estimate >90 >60 mL/min/1.7m2    GFR Estimate If Black >90 >60 mL/min/1.7m2    Calcium 8.1 (L) 8.5 - 10.1 mg/dL   XR Chest Port 1 View    Narrative    XR CHEST PORT 1 VW 10/3/2017 9:42 AM    HISTORY: Worsening respiratory status.    COMPARISON: 9/29/2017    FINDINGS: Interstitial edema and bilateral atelectasis are not  significantly changed from 9/29/2017. No pneumothorax. Stable heart  size.      Impression    IMPRESSION: No significant change from 9/29/2017.    NATALIA COSBY MD   Blood gas venous with oxyhemoglobin   Result Value Ref Range    Ph Venous 7.36 7.32 - 7.43 pH    PCO2 Venous 62 (H) 40 - 50 mm Hg    PO2 Venous 30 25 - 47 mm Hg    Bicarbonate Venous 35 (H) 21 - 28 mmol/L    FIO2 Room Air     Oxyhemoglobin Venous 48 %    Base Excess Venous 7.6 mmol/L

## 2017-10-03 NOTE — PROGRESS NOTES
[facility]  Hospitalist Progress Note  Corbin Hernandez MD 10/02/2017    Reason for Stay (Diagnosis): Shortness of breath         Assessment and Plan:      Summary of Stay: Jerald Alonzo is a 66 year old Down Syndrome male with severe cognitive delay who came to attention on 9/29/2017 with increased work of breathing. He was noted to have bilat infiltrates assoc with measured hypoxia and was started on diuretics for presumed CHF. However I note that Echo does not support LV failure (neither systolic or diastolic).      Problem List:   1. Acute hypoxic resp failure. Currently on treatment for CHF (diuretics and BP reduction). Pt's echo shows poss early Diastolic dysfunction and preserved LV EF, with normal appearing valves. Infectious eval is negative (WBC and procal are low, no fever or productive cough). I had checked the d-dimer, but ultimately, think that the CXR and Echo are not at all compatible with PE. I am not convinced that pt is significantly different than his baseline.   2. Severe pulmonary hypertension without overt right heart failure identified on echo. Likely due to FROYLAN, but also possibly worse due to current lung inflammation. It is likely that this accounts for much of pt's hypoxia. I am not convinced that further diuretics will help much. I suspect that he has quite severe FROYLAN, and may tolerate oxygen blow-by at bedtime, if this is covered by insurance and can be done at his home.   3. Chronic LE edema. Appears very well-managed with compression.       PLAN:  1. I elected to hold diuretics with the mild rise in creat and without persuasive evidence of true volume overload. Will resume oral Lasix at slightly increased dose of 40 mg daily.  2. D/C Influenza swab.   3. No indication for empiric antibiotics.  4. Likely to need supplemental O2 when he is sleeping. Has sleep study planned in the near future.    DVT Prophylaxis: Enoxaparin (Lovenox) SQ  Code Status: DNR /  "DNI  Discharge Dispo: back to group home expected.   Estimated Disch Date / # of Days until Disch: likely tomorrow, either with or without supplemental O2.        Interval History (Subjective):      It is the sense of family and of myself that pt is roughly at his baseline. He is pleasant and interactive, but not very mobile. He is bothered a lot by interventions and any tubes/lines, etc.                   Physical Exam:      Last Vital Signs:  /73 (BP Location: Left arm)  Pulse 75  Temp 97.1  F (36.2  C) (Axillary)  Resp 22  Ht 1.473 m (4' 10\")  Wt 89.4 kg (197 lb 3.2 oz)  SpO2 90%  BMI 41.21 kg/m2    I/O last 3 completed shifts:  In: 230 [P.O.:230]  Out: 550 [Urine:550]    Constitutional: Alert and interactive. Appears cheerful.  Pt is a heavy-set, very short male with Down syndrome features.   Respiratory: Clear to auscultation bilaterally, no crackles or wheezing   Cardiovascular: Regular rate and rhythm, normal S1 and S2, and no murmur noted   Abdomen: Normal bowel sounds, soft, non-distended, non-tender   Skin: No rashes, no cyanosis, dry to touch   Neuro: Alert and oriented x3, no weakness, numbness, memory loss   Extremities: LEs are wrapped. When I unwrapped them, he has obvious edema that is chronic and well-compressed with the wraps. He also has significant nodular change over the skin of his ankles bilat. This is soft and clearly very chronic and clean. Very well cared-for. He also has a lot of bluish discoloration that has been ascribed to chronic Minocycline therapy.   Other(s):        All other systems: Negative          Medications:      All current medications were reviewed with changes reflected in problem list.         Data:      All new lab and imaging data was reviewed.   Labs/Imaging:  Results for orders placed or performed during the hospital encounter of 09/29/17 (from the past 24 hour(s))   Basic metabolic panel   Result Value Ref Range    Sodium 140 133 - 144 mmol/L    " Potassium 4.6 3.4 - 5.3 mmol/L    Chloride 104 94 - 109 mmol/L    Carbon Dioxide 31 20 - 32 mmol/L    Anion Gap 5 3 - 14 mmol/L    Glucose 87 70 - 99 mg/dL    Urea Nitrogen 20 7 - 30 mg/dL    Creatinine 0.86 0.66 - 1.25 mg/dL    GFR Estimate 89 >60 mL/min/1.7m2    GFR Estimate If Black >90 >60 mL/min/1.7m2    Calcium 8.0 (L) 8.5 - 10.1 mg/dL   NT proBNP inpatient and ED   Result Value Ref Range    N-Terminal Pro BNP Inpatient 1991 (H) 0 - 900 pg/mL   Lactic acid level STAT   Result Value Ref Range    Lactic Acid 1.2 0.7 - 2.0 mmol/L

## 2017-10-03 NOTE — PLAN OF CARE
Problem: Cardiac: Heart Failure (Adult)  Goal: Signs and Symptoms of Listed Potential Problems Will be Absent, Minimized or Managed (Cardiac: Heart Failure)  Signs and symptoms of listed potential problems will be absent, minimized or managed by discharge/transition of care (reference Cardiac: Heart Failure (Adult) CPG).   VSS. LS diminished. PCS at bedside. Blow by oxygen 10 LPM Sats running high 80s low 90s . Pt. Nonverbal. Ambulated to restroom with assist of 2 and walker. New IV in L hand.

## 2017-10-03 NOTE — PLAN OF CARE
Problem: Cardiac: Heart Failure (Adult)  Goal: Signs and Symptoms of Listed Potential Problems Will be Absent, Minimized or Managed (Cardiac: Heart Failure)  Signs and symptoms of listed potential problems will be absent, minimized or managed by discharge/transition of care (reference Cardiac: Heart Failure (Adult) CPG).   Outcome: Improving  VSS. Tele SR.  Non-vernal. Unable to assess orientation. Lung sounds have expiratory wheezes. Sats are 89-90% on RA. 2-3+ edema to bilateral LE's. Black scaly skin present to bilateral LE's. Incontinent of urine. Had medium loose BM. Ambulates with assist of two and June Steady. Sitter discontinued. VPM ordered.

## 2017-10-03 NOTE — PLAN OF CARE
All VSS, LS with some expiratory wheezing, satting in the mid 80's to low 90's on blow by.  VPM present for part of shift then switched to PSC during shift d/t inability to redirect pt.  Urinary retention this shift and straight cathed 150 mL UOP.  Tele: SR.  Receiving PO lasix.  Plan is to d/c back to group home today, possible with O2.  Will continue with POC.

## 2017-10-03 NOTE — PROGRESS NOTES
Care Coordination:  Spoke to RN/MD about pt plan of care for dc back to  with overnight oxygen. Per MD, pt having increased difficulty breathing today and further testing has been ordered. Pt will not be ready for dc today.  updated on current plan. They are working on accommodating O2 as recommended for pt at their facility. Pt may need to move to another home to have overnight oxygen. They are working with the family as well.    Per   Home medical, pt will need overnight oximetry test for justification for new home O2 overnight. MD updated and orders received.    Shiloh Guerrero RN CTS

## 2017-10-03 NOTE — PROGRESS NOTES
10/03/17 1327   General Information   Onset Date 09/29/17   Start of Care Date 10/03/17   Referring Physician Romero Schofield MD   Patient Profile Review/OT: Additional Occupational Profile Info See Profile for full history and prior level of function   Patient/Family Goals Statement Pt did not staste   Swallowing Evaluation Bedside swallow evaluation   Behaviorial Observations Alert   Mode of current nutrition Oral diet   Type of oral diet Regular;Thin liquid   Respiratory Status Room air   Comments Jerald Alonzo is a 66 year old Down Syndrome male with severe cognitive delay who came to attention on 9/29/2017 with increased work of breathing. He was noted to have bilat infiltrates assoc with measured hypoxia and was started on diuretics for presumed CHF. However I note that Echo does not support LV failure (neither systolic or diastolic). Pt unable to provide hx, however nursing reports good tolerance of current diet level. Pt with baseline increased WOB and per RN, pt refusing to use supplmental O2. Bedside swallow eval completed per MD orders to further assess oropharyngeal swallow function.    Clinical Swallow Evaluation   Oral Musculature unable to assess due to poor participation/comprehension   Oral Musculature Comments Baseline increased wOB   Additional Documentation Yes   Clinical Swallow Eval: Thin Liquid Texture Trial   Mode of Presentation, Thin Liquids straw;fed by clinician   Volume of Liquid or Food Presented 4 oz   Oral Phase of Swallow WFL   Pharyngeal Phase of Swallow intact   Diagnostic Statement Pt tolerated thin liquids via straw with no overt s/sx of aspiration    Clinical Swallow Eval: Solid Food Texture Trial   Mode of Presentation, Solid self-fed   Volume of Solid Food Presented 1/2 candice cracker   Oral Phase, Solid other (see comments)  (prolonged but functional time for mastication)   Pharyngeal Phase, Solid intact   Diagnostic Statement Regular solid textures  resulted in prolonged but functioanl time for mastication; no overt s/sx of aspiration however mildly increased WOB noted   VFSS Evaluation   VFSS Additional Documentation No   FEES Evaluation   Additional Documentation No   Swallow Compensations   Swallow Compensations Alternate viscosity of consistencies;Pacing;Reduce amounts;Multiple swallow   Results Oral difficulties only   Esophageal Phase of Swallow   Patient reports or presents with symptoms of esophageal dysphagia No   General Therapy Interventions   Planned Therapy Interventions Dysphagia Treatment   Dysphagia treatment Instruction of safe swallow strategies;Compensatory strategies for swallowing;Modified diet education   Swallow Eval: Clinical Impressions   Skilled Criteria for Therapy Intervention Skilled criteria met.  Treatment indicated.   Functional Assessment Scale (FAS) 5   Treatment Diagnosis Mild oropharyngeal dysphagia   Diet texture recommendations Regular diet;Thin liquids  (self-select softer textures)   Recommended Feeding/Eating Techniques alternate between small bites and sips of food/liquid;check mouth frequently for oral residue/pocketing;hard swallow w/ each bite or sip;maintain upright posture during/after eating for 30 mins;small sips/bites   Demonstrates Need for Referral to Another Service occupational therapy;physical therapy;respiratory therapy   Therapy Frequency 3 times/wk   Predicted Duration of Therapy Intervention (days/wks) 1 week   Anticipated Discharge Disposition home w/ assist   Risks and Benefits of Treatment have been explained. Yes   Patient, family and/or staff in agreement with Plan of Care Yes   Clinical Impression Comments SLP: Bedside swallow eval completed per MD orders. Pt presents with mild oropharyngeal dysphagia in the setting of poor respiratory status. Pt noted to have baseline increased WOB and per RN, pt has refused use of supplemental O2. Pt was only agreeable to small amount of PO trials. Pt tolerated  thin liquids via straw and regular solid textures with no overt s/sx of aspiration. Regular solid textures resulted in mildly prolonged but functional time for mastication; pt noted to have mildly increased WOB with mastication. Recommend pt continue regular textures and thin liquids with supervision. Pt should self-select softer textures, take small single sips/bites, pace self, and alternate consistencies. Hold PO should pt demontrate overt s/sx of aspiration or if change in pts respiratory status observed. ST to continue to follow for diet tolerance. Anticipate pt will meet goals prior to discharge.    Total Evaluation Time   Total Evaluation Time (Minutes) 12

## 2017-10-03 NOTE — PLAN OF CARE
Problem: Patient Care Overview  Goal: Plan of Care/Patient Progress Review  Discharge Planner SLP   Patient plan for discharge: none stated  Current status: Bedside swallow eval completed today. Pt presents with mild oropharyngeal dysphagia in the setting of poor respiratory status. Recommend pt continue regular textures and thin liquids with supervision. Pt should self-select softer textures, take small single sips/bites, pace self, and alternate consistencies. Hold PO should pt demontrate overt s/sx of aspiration or if change in pts respiratory status observed.  Barriers to return to prior living situation: dysphagia; poor respiratory status  Recommendations for discharge: anticipate pt will meet goals prior to discharge  Rationale for recommendations: suspect pt is close to tolerating baseline diet level        Entered by: Martha Soto 10/03/2017 1:35 PM

## 2017-10-03 NOTE — PROGRESS NOTES
"[facility]     Date: 10/3/2017  Admission Dx: CHF  Pulmonary History: None  Home Nebulizer/MDI Use: None  Home Oxygen:  Acuity Level (RCAT flow sheet): 2  Aerosol Therapy initiated: Changed Duoneb from Q4 w/a to Q4 hours.  Added Albuterol Q6 hours prn.      Pulmonary Hygiene initiated: Deep breath and cough QID if he will participate.      Volume Expansion initiated: IS QID if he will participate.      Current Oxygen Requirements: Face tent 70% 10 Lpm  Current SpO2: 96%    Re-evaluation date: 10/6/2017    Patient Education: Pt is non-verbal and not cooperative.      See \"RT Assessments\" flow sheet for patient assessment scoring and Acuity Level Details.     Kaden Rader  October 3, 2017.12:07 PM            \  "

## 2017-10-04 ENCOUNTER — APPOINTMENT (OUTPATIENT)
Dept: SPEECH THERAPY | Facility: CLINIC | Age: 67
DRG: 314 | End: 2017-10-04
Payer: MEDICARE

## 2017-10-04 LAB
BASE EXCESS BLDV CALC-SCNC: 9.4 MMOL/L
HCO3 BLDV-SCNC: 36 MMOL/L (ref 21–28)
O2/TOTAL GAS SETTING VFR VENT: 10 %
OXYHGB MFR BLDV: 50 %
PCO2 BLDV: 66 MM HG (ref 40–50)
PH BLDV: 7.35 PH (ref 7.32–7.43)
PO2 BLDV: 32 MM HG (ref 25–47)

## 2017-10-04 PROCEDURE — 94640 AIRWAY INHALATION TREATMENT: CPT

## 2017-10-04 PROCEDURE — 25000132 ZZH RX MED GY IP 250 OP 250 PS 637: Mod: GY | Performed by: INTERNAL MEDICINE

## 2017-10-04 PROCEDURE — 94640 AIRWAY INHALATION TREATMENT: CPT | Mod: 76

## 2017-10-04 PROCEDURE — 40000275 ZZH STATISTIC RCP TIME EA 10 MIN

## 2017-10-04 PROCEDURE — 99233 SBSQ HOSP IP/OBS HIGH 50: CPT | Performed by: INTERNAL MEDICINE

## 2017-10-04 PROCEDURE — 25000128 H RX IP 250 OP 636: Performed by: INTERNAL MEDICINE

## 2017-10-04 PROCEDURE — 40000915 ZZH STATISTIC SITTER, EVENING HOURS

## 2017-10-04 PROCEDURE — A9270 NON-COVERED ITEM OR SERVICE: HCPCS | Mod: GY | Performed by: INTERNAL MEDICINE

## 2017-10-04 PROCEDURE — 25000125 ZZHC RX 250: Performed by: INTERNAL MEDICINE

## 2017-10-04 PROCEDURE — 92526 ORAL FUNCTION THERAPY: CPT | Mod: GN

## 2017-10-04 PROCEDURE — 40000916 ZZH STATISTIC SITTER, NIGHT HOURS

## 2017-10-04 PROCEDURE — 82805 BLOOD GASES W/O2 SATURATION: CPT | Performed by: INTERNAL MEDICINE

## 2017-10-04 PROCEDURE — 12000007 ZZH R&B INTERMEDIATE

## 2017-10-04 PROCEDURE — 36415 COLL VENOUS BLD VENIPUNCTURE: CPT | Performed by: INTERNAL MEDICINE

## 2017-10-04 PROCEDURE — 40000914 ZZH STATISTIC SITTER, DAY HOURS

## 2017-10-04 PROCEDURE — 40000225 ZZH STATISTIC SLP WARD VISIT

## 2017-10-04 RX ORDER — IPRATROPIUM BROMIDE AND ALBUTEROL SULFATE 2.5; .5 MG/3ML; MG/3ML
3 SOLUTION RESPIRATORY (INHALATION) 4 TIMES DAILY
Status: DISCONTINUED | OUTPATIENT
Start: 2017-10-04 | End: 2017-10-07

## 2017-10-04 RX ORDER — PREDNISONE 20 MG/1
40 TABLET ORAL DAILY
Status: DISCONTINUED | OUTPATIENT
Start: 2017-10-05 | End: 2017-10-05

## 2017-10-04 RX ADMIN — IPRATROPIUM BROMIDE AND ALBUTEROL SULFATE 3 ML: .5; 3 SOLUTION RESPIRATORY (INHALATION) at 07:16

## 2017-10-04 RX ADMIN — LEVOTHYROXINE SODIUM 50 MCG: 25 TABLET ORAL at 07:57

## 2017-10-04 RX ADMIN — ENOXAPARIN SODIUM 40 MG: 40 INJECTION SUBCUTANEOUS at 20:25

## 2017-10-04 RX ADMIN — DOCUSATE SODIUM 200 MG: 100 CAPSULE, LIQUID FILLED ORAL at 07:56

## 2017-10-04 RX ADMIN — ACETAMINOPHEN 1000 MG: 500 TABLET, FILM COATED ORAL at 07:56

## 2017-10-04 RX ADMIN — IPRATROPIUM BROMIDE AND ALBUTEROL SULFATE 3 ML: .5; 3 SOLUTION RESPIRATORY (INHALATION) at 19:18

## 2017-10-04 RX ADMIN — ALLOPURINOL 100 MG: 100 TABLET ORAL at 20:25

## 2017-10-04 RX ADMIN — POTASSIUM CHLORIDE 10 MEQ: 750 TABLET, FILM COATED, EXTENDED RELEASE ORAL at 07:56

## 2017-10-04 RX ADMIN — MINOCYCLINE HYDROCHLORIDE 100 MG: 100 CAPSULE ORAL at 07:55

## 2017-10-04 RX ADMIN — IPRATROPIUM BROMIDE AND ALBUTEROL SULFATE 3 ML: .5; 3 SOLUTION RESPIRATORY (INHALATION) at 15:41

## 2017-10-04 RX ADMIN — CETIRIZINE HYDROCHLORIDE 10 MG: 10 TABLET, FILM COATED ORAL at 07:57

## 2017-10-04 RX ADMIN — ACETAMINOPHEN 1000 MG: 500 TABLET, FILM COATED ORAL at 20:25

## 2017-10-04 RX ADMIN — ASPIRIN 325 MG ORAL TABLET 325 MG: 325 PILL ORAL at 07:55

## 2017-10-04 RX ADMIN — METHYLPREDNISOLONE SODIUM SUCCINATE 40 MG: 40 INJECTION, POWDER, FOR SOLUTION INTRAMUSCULAR; INTRAVENOUS at 09:36

## 2017-10-04 RX ADMIN — FUROSEMIDE 40 MG: 40 TABLET ORAL at 07:56

## 2017-10-04 RX ADMIN — ALLOPURINOL 100 MG: 100 TABLET ORAL at 07:57

## 2017-10-04 RX ADMIN — SENNOSIDES AND DOCUSATE SODIUM 1 TABLET: 8.6; 5 TABLET ORAL at 20:25

## 2017-10-04 RX ADMIN — PANTOPRAZOLE SODIUM 40 MG: 40 TABLET, DELAYED RELEASE ORAL at 06:56

## 2017-10-04 RX ADMIN — SENNOSIDES AND DOCUSATE SODIUM 1 TABLET: 8.6; 5 TABLET ORAL at 07:55

## 2017-10-04 RX ADMIN — MINOCYCLINE HYDROCHLORIDE 100 MG: 100 CAPSULE ORAL at 20:25

## 2017-10-04 RX ADMIN — TAMSULOSIN HYDROCHLORIDE 0.4 MG: 0.4 CAPSULE ORAL at 20:25

## 2017-10-04 RX ADMIN — IPRATROPIUM BROMIDE AND ALBUTEROL SULFATE 3 ML: .5; 3 SOLUTION RESPIRATORY (INHALATION) at 11:30

## 2017-10-04 NOTE — PLAN OF CARE
Problem: Patient Care Overview  Goal: Plan of Care/Patient Progress Review  Outcome: No Change  Pt alert, unable to assess orientation d/t nonverbal. However, pt is able to answer yes or no questions with a smile.  VSS, LS diminished, BS audible - BM 10/3. Up A2, low fat diet. Tele - SR. Possible DC tomorrow.

## 2017-10-04 NOTE — PROGRESS NOTES
[facility]  Hospitalist Progress Note  Romero Schofield MD 10/04/2017    Reason for Stay (Diagnosis): Shortness of breath         Assessment and Plan:      Summary of Stay:  Jerald Alonzo is a 66 year old Down Syndrome male with severe cognitive delay who came to attention on 9/29/2017 with increased work of breathing. He was noted to have bilateral infiltrates assoc with measured hypoxia and was started on diuretics for presumed CHF. However, Echo does not support LV failure (neither systolic or diastolic) and it is suspected that severe pulmonary hypertension may be the cause of his hypoxia as he is felt to likely have untreated FROYLAN.  He is not tolerant of procedures which limits our ability to extend his workup.  He has been afebrile.  He had increased work of breathing 10/3 with some CO2 retention prompting IV steroids/neb treatments.  Procal is mildly elevated but he has been afebrile and so far not treated for pneumonia.  The approach taken so far is blow-by O2 (does not tolerate O2 mask).       Problem List:   1. Acute hypoxic resp failure.  Pt's echo shows poss early Diastolic dysfunction and preserved LV EF, with normal appearing valves. Infectious eval is not convincing (WBC normal, and procal 0.42, no fever or productive cough). My colleague had checked the d-dimer, but LE US was negative and CXR doesn't seem compatible w/ PE.  Also, it is suspected that much of what we are seeing is chronic.  --continue nebs, daily 40 prednisone given some CO2 retention (4-5 day burst planned).  --overnight oximetry to try to qualify him for home O2 and screen for untreated FROYLAN  --rechecked, procal slightly higher.  Monitor for fever or productive cough.  --monitor for fever    2. Severe pulmonary hypertension without overt right heart failure identified on echo. Likely due to FROYLAN, but also possibly worse due to current lung inflammation. It is likely that this accounts for much of pt's  hypoxia. I am not convinced that further diuretics will help much. I suspect that he has quite severe FROYLAN, and may tolerate oxygen blow-by at bedtime, if this is covered by insurance and can be done at his home.     3. Chronic LE edema. Appears very well-managed with compression.     4.  Down's syndrome w/ severe cognitive impairment, intolerant to basic medical treatments such as IV lines, oximeters, nasal cannulas and certainly would not tolerate cpap/bipap.  Update: eventually I reached Shayna today to discuss goals of care.  Disposition plan is still unclear.  Blayne likely has a progressive issue here which is likely to get worse as he cannot tolerate treatment of FROYLAN.  Plan for palliative care consult as we need a good care plan for if/when Blayne's respiratory failure worsens and what to do.  They would like to discuss a palliative approach further.  Blayne has numerous siblings who are involved in medical decision making.      DVT Prophylaxis: Enoxaparin (Lovenox) SQ  Code Status: DNR / DNI  Discharge Dispo: [email or web address] expected.   Estimated Disch Date / # of Days until Disch: likely tomorrow, either with or without supplemental O2.        Interval History (Subjective):      Appears brighter and more comfortable today  Smiles, gave 'thumbs up'  Oxygenation about the same  No new issues otherwise  Tried calling his sister, Shayna, again today.  Update: eventually I reached Shayna.  Disposition plan is still unclear.  Blayne likely has a progressive issue here which is likely to get worse as he cannot tolerate treatment of FROYLAN.  Plan for palliative care consult as we need a good care plan for if/when Joselitos respiratory failure worsens and what to do.  They would like to discuss a palliative approach further.  Blayne has numerous siblings who are involved in medical decision making.                  Physical Exam:      Last Vital Signs:  /60 (BP Location: Left arm)  Pulse 87  Temp 97.4  F (36.3  C)  "(Axillary)  Resp 20  Ht 1.473 m (4' 10\")  Wt 85.3 kg (188 lb)  SpO2 98%  BMI 39.29 kg/m2    I/O last 3 completed shifts:  In: 240 [P.O.:240]  Out: 1150 [Urine:1150]    Constitutional: Alert and interactive. Appears cheerful.  Pt is a heavy-set, very short male with Down syndrome features.   Respiratory: Some increased work of breathing.  Mild bilat dry crackles.     Cardiovascular: Regular rate and rhythm, normal S1 and S2, and no murmur noted   Abdomen: Normal bowel sounds, soft, non-distended, non-tender   Skin: No rashes, no cyanosis, dry to touch   Neuro: Alert and oriented x3, no weakness, numbness, memory loss   Extremities: He has significant woody plaque-like change over the skin of his ankles bilat. This is soft and clearly very chronic and clean. Very well cared-for. He also has a lot of bluish discoloration that has been ascribed to being chronic.   Other(s):        All other systems: Negative          Medications:      All current medications were reviewed with changes reflected in problem list.         Data:      All new lab and imaging data was reviewed.   Labs/Imaging:  Results for orders placed or performed during the hospital encounter of 09/29/17 (from the past 24 hour(s))   Procalcitonin   Result Value Ref Range    Procalcitonin 0.59 ng/ml   Blood gas venous with oxyhemoglobin   Result Value Ref Range    Ph Venous 7.35 7.32 - 7.43 pH    PCO2 Venous 66 (H) 40 - 50 mm Hg    PO2 Venous 32 25 - 47 mm Hg    Bicarbonate Venous 36 (H) 21 - 28 mmol/L    FIO2 10     Oxyhemoglobin Venous 50 %    Base Excess Venous 9.4 mmol/L       "

## 2017-10-04 NOTE — PLAN OF CARE
Problem: Patient Care Overview  Goal: Plan of Care/Patient Progress Review  Discharge Planner SLP   Patient plan for discharge: none stated  Current status: Pt tolerated thin liquids via straw and regular solid textures with no overt s/sx of aspiration. Pt with baseline increased WOB noted at baseline which remained unchanged during PO trials. Recommend pt continue regular textures and thin liquids with supervision. Pt should self-select softer textures, take small single sips/bites, pace self, and alternate consistencies. Hold PO should pt demontrate overt s/sx of aspiration or if change in pts respiratory status observed.  Barriers to return to prior living situation: none  Recommendations for discharge: no further ST needs for dysphagia; goals met  Rationale for recommendations: pt is tolerating regular diet w/o difficulty; caregivers to assist pt in choosing softer textures       Entered by: Martha Soto 10/04/2017 11:27 AM        Speech Language Therapy Discharge Summary     Reason for therapy discharge:    All goals and outcomes met, no further needs identified.     Progress towards therapy goal(s). See goals on Care Plan in [facility] electronic health record for goal details.  Goals met     Therapy recommendation(s):    No further therapy is recommended.

## 2017-10-04 NOTE — PLAN OF CARE
"Problem: Patient Care Overview  Goal: Plan of Care/Patient Progress Review  Outcome: Improving  Pt nonverbal, responsive to voice, answers some questions by smiling or \"thumbs up\". Continent of bladder. New mepilex placed to right thigh wound. Right leg dressing removed, ulceration noted, Mepilex applied, MD informed and requested WOC consult. Pt removed own IV this shift, MD aware, no need to replace IV access at this time. IV solumedrol changed to PO prednisone. Takes pills whole in apple sauce. Spoke with  nurse Burton, updated on pt and plan. O2 low 90's w/ 10LPM of blow by O2.       "

## 2017-10-04 NOTE — PROGRESS NOTES
RT Note:    RT received order to perform Overnight Oximetry, order states to qualify for home O2. Patient already requires continuous oxygen. SPO2 <88% on room air when supplemental O2 removed. Overnight oximetry not done, patient already qualifies for home O2, test not necessary.    Jolly Grier, RT 10/4/2017, 12:44 AM

## 2017-10-04 NOTE — PROGRESS NOTES
"[facility] RCAT     Date: 10/4/17  Admission Dx: CHF  Pulmonary History: none  Home Nebulizer/MDI Use: none  Home Oxygen: none  Acuity Level (RCAT flow sheet): 3  Aerosol Therapy initiated: Changed Duoneb Q4 to QID.      Pulmonary Hygiene initiated: Deep breathe and cough if pt will participate      Volume Expansion initiated: IS if patient will participate      Current Oxygen Requirements: Blow by oxygen 100% 10lpm as patient won't keep oxygen mask on  Current SpO2: 88%    Re-evaluation date: 10/7/17    Patient Education: Patient is non-verbal and not cooperative      See \"RT Assessments\" flow sheet for patient assessment scoring and Acuity Level Details.             "

## 2017-10-04 NOTE — PLAN OF CARE
Some decreased SAT's in the 80's, all other VSS.  Receiving IV solumedrol.  PSC at bedside.  Will continue with POC.

## 2017-10-04 NOTE — PROGRESS NOTES
Overnight oximetry not performed as requested.  Needs to be done to qualify for nocturnal O2 and also screen for FROYLAN.  Please perform tonight as ordered.

## 2017-10-05 ENCOUNTER — APPOINTMENT (OUTPATIENT)
Dept: CT IMAGING | Facility: CLINIC | Age: 67
DRG: 314 | End: 2017-10-05
Attending: INTERNAL MEDICINE
Payer: MEDICARE

## 2017-10-05 LAB
BASE EXCESS BLDV CALC-SCNC: 7.8 MMOL/L
HCO3 BLDV-SCNC: 34 MMOL/L (ref 21–28)
O2/TOTAL GAS SETTING VFR VENT: ABNORMAL %
OXYHGB MFR BLDV: 57 %
PCO2 BLDV: 56 MM HG (ref 40–50)
PH BLDV: 7.39 PH (ref 7.32–7.43)
PO2 BLDV: 33 MM HG (ref 25–47)

## 2017-10-05 PROCEDURE — 94640 AIRWAY INHALATION TREATMENT: CPT | Mod: 76

## 2017-10-05 PROCEDURE — A9270 NON-COVERED ITEM OR SERVICE: HCPCS | Mod: GY | Performed by: INTERNAL MEDICINE

## 2017-10-05 PROCEDURE — 40000275 ZZH STATISTIC RCP TIME EA 10 MIN

## 2017-10-05 PROCEDURE — 99211 OFF/OP EST MAY X REQ PHY/QHP: CPT

## 2017-10-05 PROCEDURE — 36415 COLL VENOUS BLD VENIPUNCTURE: CPT | Performed by: INTERNAL MEDICINE

## 2017-10-05 PROCEDURE — 94762 N-INVAS EAR/PLS OXIMTRY CONT: CPT

## 2017-10-05 PROCEDURE — 25000128 H RX IP 250 OP 636: Performed by: INTERNAL MEDICINE

## 2017-10-05 PROCEDURE — 40000914 ZZH STATISTIC SITTER, DAY HOURS

## 2017-10-05 PROCEDURE — 94640 AIRWAY INHALATION TREATMENT: CPT

## 2017-10-05 PROCEDURE — 25000125 ZZHC RX 250: Performed by: INTERNAL MEDICINE

## 2017-10-05 PROCEDURE — 40000556 ZZH STATISTIC PERIPHERAL IV START W US GUIDANCE

## 2017-10-05 PROCEDURE — 40000915 ZZH STATISTIC SITTER, EVENING HOURS

## 2017-10-05 PROCEDURE — 25000132 ZZH RX MED GY IP 250 OP 250 PS 637: Mod: GY | Performed by: INTERNAL MEDICINE

## 2017-10-05 PROCEDURE — 99223 1ST HOSP IP/OBS HIGH 75: CPT | Performed by: CLINICAL NURSE SPECIALIST

## 2017-10-05 PROCEDURE — 71260 CT THORAX DX C+: CPT

## 2017-10-05 PROCEDURE — 82805 BLOOD GASES W/O2 SATURATION: CPT | Performed by: INTERNAL MEDICINE

## 2017-10-05 PROCEDURE — 99233 SBSQ HOSP IP/OBS HIGH 50: CPT | Performed by: INTERNAL MEDICINE

## 2017-10-05 PROCEDURE — 12000007 ZZH R&B INTERMEDIATE

## 2017-10-05 RX ORDER — IOPAMIDOL 755 MG/ML
500 INJECTION, SOLUTION INTRAVASCULAR ONCE
Status: COMPLETED | OUTPATIENT
Start: 2017-10-05 | End: 2017-10-05

## 2017-10-05 RX ORDER — PREDNISONE 20 MG/1
20 TABLET ORAL DAILY
Status: COMPLETED | OUTPATIENT
Start: 2017-10-06 | End: 2017-10-07

## 2017-10-05 RX ORDER — LEVOFLOXACIN 5 MG/ML
500 INJECTION, SOLUTION INTRAVENOUS EVERY 24 HOURS
Status: DISCONTINUED | OUTPATIENT
Start: 2017-10-05 | End: 2017-10-07

## 2017-10-05 RX ORDER — LORAZEPAM 2 MG/ML
0.5 INJECTION INTRAMUSCULAR
Status: COMPLETED | OUTPATIENT
Start: 2017-10-05 | End: 2017-10-05

## 2017-10-05 RX ADMIN — ACETAMINOPHEN 1000 MG: 500 TABLET, FILM COATED ORAL at 21:56

## 2017-10-05 RX ADMIN — POTASSIUM CHLORIDE 10 MEQ: 750 TABLET, FILM COATED, EXTENDED RELEASE ORAL at 08:54

## 2017-10-05 RX ADMIN — IPRATROPIUM BROMIDE AND ALBUTEROL SULFATE 3 ML: .5; 3 SOLUTION RESPIRATORY (INHALATION) at 15:53

## 2017-10-05 RX ADMIN — PREDNISONE 40 MG: 20 TABLET ORAL at 08:54

## 2017-10-05 RX ADMIN — ASPIRIN 325 MG ORAL TABLET 325 MG: 325 PILL ORAL at 08:53

## 2017-10-05 RX ADMIN — IPRATROPIUM BROMIDE AND ALBUTEROL SULFATE 3 ML: .5; 3 SOLUTION RESPIRATORY (INHALATION) at 20:02

## 2017-10-05 RX ADMIN — ALLOPURINOL 100 MG: 100 TABLET ORAL at 08:54

## 2017-10-05 RX ADMIN — IPRATROPIUM BROMIDE AND ALBUTEROL SULFATE 3 ML: .5; 3 SOLUTION RESPIRATORY (INHALATION) at 08:17

## 2017-10-05 RX ADMIN — CETIRIZINE HYDROCHLORIDE 10 MG: 10 TABLET, FILM COATED ORAL at 08:54

## 2017-10-05 RX ADMIN — SODIUM CHLORIDE 84 ML: 9 INJECTION, SOLUTION INTRAVENOUS at 11:06

## 2017-10-05 RX ADMIN — ENOXAPARIN SODIUM 40 MG: 40 INJECTION SUBCUTANEOUS at 20:45

## 2017-10-05 RX ADMIN — ACETAMINOPHEN 1000 MG: 500 TABLET, FILM COATED ORAL at 08:54

## 2017-10-05 RX ADMIN — ACETAMINOPHEN 650 MG: 325 TABLET, FILM COATED ORAL at 18:27

## 2017-10-05 RX ADMIN — SENNOSIDES AND DOCUSATE SODIUM 1 TABLET: 8.6; 5 TABLET ORAL at 20:46

## 2017-10-05 RX ADMIN — ALLOPURINOL 100 MG: 100 TABLET ORAL at 20:46

## 2017-10-05 RX ADMIN — IOPAMIDOL 70 ML: 755 INJECTION, SOLUTION INTRAVENOUS at 10:59

## 2017-10-05 RX ADMIN — SENNOSIDES AND DOCUSATE SODIUM 1 TABLET: 8.6; 5 TABLET ORAL at 08:53

## 2017-10-05 RX ADMIN — TAMSULOSIN HYDROCHLORIDE 0.4 MG: 0.4 CAPSULE ORAL at 20:46

## 2017-10-05 RX ADMIN — MINOCYCLINE HYDROCHLORIDE 100 MG: 100 CAPSULE ORAL at 20:46

## 2017-10-05 RX ADMIN — LEVOTHYROXINE SODIUM 50 MCG: 25 TABLET ORAL at 08:54

## 2017-10-05 RX ADMIN — PANTOPRAZOLE SODIUM 40 MG: 40 TABLET, DELAYED RELEASE ORAL at 07:25

## 2017-10-05 RX ADMIN — MINOCYCLINE HYDROCHLORIDE 100 MG: 100 CAPSULE ORAL at 08:53

## 2017-10-05 RX ADMIN — SODIUM CHLORIDE 500 ML: 9 INJECTION, SOLUTION INTRAVENOUS at 11:30

## 2017-10-05 RX ADMIN — IPRATROPIUM BROMIDE AND ALBUTEROL SULFATE 3 ML: .5; 3 SOLUTION RESPIRATORY (INHALATION) at 11:58

## 2017-10-05 RX ADMIN — LEVOFLOXACIN 500 MG: 5 INJECTION, SOLUTION INTRAVENOUS at 14:02

## 2017-10-05 RX ADMIN — LORAZEPAM 0.5 MG: 2 INJECTION INTRAMUSCULAR; INTRAVENOUS at 10:33

## 2017-10-05 RX ADMIN — FUROSEMIDE 40 MG: 40 TABLET ORAL at 08:54

## 2017-10-05 NOTE — PLAN OF CARE
"Pt. Alert, seems a bit restless tonight, Tele SR VSS, no s/sx of pain, repositioned every 2 hours, LS clear/dim 02 \"blow by\" 02 at 10L multiple abrasions due to self inflicted scratches. Voiding in portable urinal, sitter present.  "

## 2017-10-05 NOTE — PROGRESS NOTES
[facility]  Hospitalist Progress Note  Romero Schofield MD 10/05/2017    Reason for Stay (Diagnosis): Shortness of breath         Assessment and Plan:      Summary of Stay:  Jerald Alonzo is a 66 year old Down Syndrome male with severe cognitive delay who came to attention on 9/29/2017 with increased work of breathing for the preceding week. He was noted to have bilateral infiltrates assoc with measured hypoxia and was started on diuretics for presumed CHF. However, Echo does not support LV failure (neither systolic or diastolic) and it is suspected that severe pulmonary hypertension may have been the cause of his hypoxia as he is felt to likely have untreated FROYLAN.  He was initially treated with supplemental O2, nebs and a trial of steroids.  He has been afebrile but Procal is mildly elevated.  LE US was negative but TTE did suggest right heart failure as noted above.  CT pulmonary angiogram was obtained on 10/5 with some light sedation to expand his workup and he did appear to have some ground glass infiltrates with small effusions suggestive of pneumonia.  He has been started on IV levaquin.    It remains to be seen how much Blayne will improve.  He currently is requiring high rate blow by O2.  Palliative care has been consulted.  His hypoxia may improve with treatment for pneumonia but it seems likely that he has severe pulmonary HTN/untreated FROYLAN (certainly could not tolerate cpap, family agrees).  Disposition will be difficult as he is requiring such a high flow rate of O2 (blow by at 15L/min, doesn't tolerate NC/mask) and it is not clear to me that his group home will be able to make arrangements for that high of a rate.     Problem List:   1. Acute hypoxic resp failure due to severe pulmonary HTN, untreated FROYLAN and suspected pneumonia.  Pt's echo shows possible early Diastolic dysfunction and preserved LV EF, with normal appearing valves. Infectious eval showed WBC normal, and  procal 0.42, no fever but ground glass on CT scan so treat empirically for pneumonia as therapeutic options are limited. D-dimer noted to be elevated but LE US was negative and CTPA negative for PE.  Also, it is suspected that much of what we are seeing is chronic.  --continue nebs, daily prednisone given some CO2 retention (4-5 day burst planned).  --rechecked, procal slightly higher.  CT shows ground glass w/ small effusions so plan to treat for pneumonia (abx started 10/5) w/ levaquin  --monitor for fever    2. Severe pulmonary hypertension: Likely due to FROYLAN, but also possibly worse due to current lung inflammation. It is likely that this accounts for much of pt's hypoxia. I am not convinced that further diuretics will help much. I suspect that he has quite severe FROYLAN, and may tolerate oxygen blow-by at bedtime, if this is covered by insurance and can be done at his home.  CTPA negative for PE.    3. Chronic LE edema. Appears very well-managed with compression.     4.  Down's syndrome w/ severe cognitive impairment, intolerant to basic medical treatments such as IV lines, oximeters, nasal cannulas and certainly would not tolerate cpap/bipap.  Disposition plan is still unclear.  Blayne likely has a progressive issue here which is likely to get worse as he cannot tolerate treatment of FROYLAN.  Plan for palliative care consult as we need a good care plan for if/when Blayne's respiratory failure worsens and what to do. Hopefully his hypoxia will improve somewhat w/ levaquin for pneumonia (not started until 10/5).  Family would like to discuss a palliative approach further.  Blayne has numerous siblings who are involved in medical decision making.      DVT Prophylaxis: Enoxaparin (Lovenox) SQ  Code Status: DNR / DNI  Discharge Dispo: back to group home expected.   Estimated Disch Date / # of Days until Disch: likely tomorrow, either with or without supplemental O2.        Interval History (Subjective):      Appears  "brighter and more comfortable today  Smiles  Oxygenation about the same  No new issues otherwise  Tried calling his sister, Shayna, again today, couldn't reach her.  CTPA negative for PE, showed possible pneumonia.  Starting levaquin empirically.                  Physical Exam:      Last Vital Signs:  /65 (BP Location: Right arm)  Pulse 63  Temp 96.4  F (35.8  C) (Axillary)  Resp 20  Ht 1.473 m (4' 10\")  Wt 84.9 kg (187 lb 1.6 oz)  SpO2 94%  BMI 39.1 kg/m2    I/O last 3 completed shifts:  In: 720 [P.O.:720]  Out: 2275 [Urine:2275]    Constitutional: Alert and interactive. Appears cheerful.  Pt is a heavy-set, very short male with Down syndrome features.   Respiratory: Some increased work of breathing.  Mild bilat dry crackles.     Cardiovascular: Regular rate and rhythm, normal S1 and S2, and no murmur noted   Abdomen: Normal bowel sounds, soft, non-distended, non-tender   Skin: No rashes, no cyanosis, dry to touch   Neuro: Alert and oriented x3, no weakness, numbness, memory loss   Extremities: He has significant woody plaque-like change over the skin of his ankles bilat. This is soft and clearly very chronic and clean. Very well cared-for. He also has a lot of bluish discoloration that has been ascribed to being chronic.   Other(s):        All other systems: Negative          Medications:      All current medications were reviewed with changes reflected in problem list.         Data:      All new lab and imaging data was reviewed.   Labs/Imaging:  Results for orders placed or performed during the hospital encounter of 09/29/17 (from the past 24 hour(s))   Blood gas venous with oxyhemoglobin   Result Value Ref Range    Ph Venous 7.39 7.32 - 7.43 pH    PCO2 Venous 56 (H) 40 - 50 mm Hg    PO2 Venous 33 25 - 47 mm Hg    Bicarbonate Venous 34 (H) 21 - 28 mmol/L    FIO2 Room Air     Oxyhemoglobin Venous 57 %    Base Excess Venous 7.8 mmol/L   CT Chest Pulmonary Embolism w Contrast    Narrative    CT CHEST " PULMONARY EMBOLISM WITH CONTRAST October 5, 2017 11:09 AM     HISTORY: Rule out pulmonary embolus, evaluate for fibrotic changes or  infectious appearing infiltrate.     TECHNIQUE: Thin section axial images are performed from the thoracic  inlet to the lung bases utilizing 70 mL of Isovue 370 IV contrast  without adverse event. Coronal reformatted images are also generated.  Radiation dose for this scan was reduced using automated exposure  control, adjustment of the mA and/or kV according to patient size, or  iterative reconstruction technique.    FINDINGS:     Chest: Areas of infiltrate and consolidation are noted in the lower  lobes in addition to bandlike areas of atelectasis in the lingula and  right upper lobe. Pneumonia is possible. Trace pleural effusions are  noted bilaterally. No pericardial fluid. Heart is normal in size. The  esophagus is unremarkable. Thyroid gland appears prominent in size. No  enlarged axillary lymph nodes. Probable reactive adenopathy in the  mediastinum and hilar regions. A prominent right tracheoesophageal  lymph node measures 2.1 x 1.4 cm on series 4, image 32 in the right  posterior upper mediastinum. No prior exam is available to assess for  chronicity. Remaining lymph nodes are smaller in size. A left  supraclavicular lymph node is also present measuring 1.5 cm on series  4, image 10. No evidence of pulmonary embolism however the study is  limited due to patient respiratory motion. Thoracic aorta is  unremarkable. Limited images upper abdomen demonstrate elevation of  the right hemidiaphragm. Bone window examination demonstrates  degenerative spine changes with spinal fusion from approximately T6  through the upper lumbar spine.      Impression    IMPRESSION:  1. Bibasilar infiltrates and right lower lobe consolidation with trace  pleural effusions. Pneumonia is possible. No evidence of pulmonary  embolism. Thoracic aorta is unremarkable.  2. Probable reactive lymph nodes with  enlarged upper mediastinal node  in the tracheoesophageal region and also prominent left  supraclavicular lymph node. Remaining lymph nodes are mildly prominent  but do not exceed size criteria for abnormality.  3. Extensive spinal fusion.    KIARRA BARRON MD

## 2017-10-05 NOTE — PROGRESS NOTES
Focus: wound  D: Per MD note: 66-year-old male with Down syndrome, cognitive dysfunction and nonverbal status.  History is obtained from the chart, Dr. Pollock from the Emergency Department with whom I spoke and a Group Home Staff Member who is here with the patient and knows him well.  Reportedly the patient has been having increasing work of breathing for the past 1 week.  He has chronic lower extremity edema which has been treated with Ace wraps.  He resides in a house with his brother who also has cognitive dysfunction.  Apparently they have a contract with a Group Home, and a Group Home Staff Member stays at the house with them 24 hours a day.  At baseline the patient needs a regular diet.  He has no history of aspiration or choking.  He walks with a walker in the home and uses a wheelchair in public.  He is minimally nonverbal.  He will follow commands if the understands what is being said to him.   [facility] nurse consulted for wounds to groin, self inflicted scratches to bilateral inner thighs, small red dry scabs. No signs of infection  Wounds to buttocks suspect combination of moisture and friction maybe from diaper.   BLE severe LEAD disease, hemosiderin staining and thick woody skin, RN reports is painful when touched and pt yells.  I: 15 min to assess and discuss POC with staff, update orders  A/P; MASD to buttocks will initiate Incontinence protocol  Bilateral inner thigh stable excoriations, leave open to air  BLE: continue with current POC, cleanse legs and feet gently, Sween to intact skin and apply wraps per routine.  Consult completed.

## 2017-10-05 NOTE — PLAN OF CARE
Problem: Cardiac: Heart Failure (Adult)  Goal: Signs and Symptoms of Listed Potential Problems Will be Absent, Minimized or Managed (Cardiac: Heart Failure)  Signs and symptoms of listed potential problems will be absent, minimized or managed by discharge/transition of care (reference Cardiac: Heart Failure (Adult) CPG).   Outcome: No Change  Tele: SB/SR with rare PVC's. CT chest ordered and performed-see results review. IV Levaquin started. Received 500cc fluid bolus d/t receiving contrast for CT. PSC at bedside. Supplemental oxygen blow by at 11L.

## 2017-10-05 NOTE — CONSULTS
[facility]    Palliative Care Consultation   Text Page    Date of Admission:  9/29/2017    Assessment & Plan   Jerald Alonzo is a 66 year old male who was admitted on 9/29/2017. I was asked by Hospitalist Romero Schofield MD to see the patient for severe pulmonary HTN, not able to be treated Likely to be progressive.    Recommendations:  1. Dyspnea - continue with current interventions. Family in interested in comfort focused care.     2. Goals of Care: DNR/DNI - Restorative care. The patient's sister Audra Anand as one of the patient's five guardians request to use [facility] on dismissal. Discussed case with  Shiloh Guerrero RN and  AJY Cisneros regarding hospice dismissal.       Disease Process/es & Symptoms:  Jerald Alonzo is a 66 year old patient admitted 9/29/2017 with symptoms of increased work of breathing. He was found to have bilateral infiltrates and hypoxia and was diuresed for presumed CHF. 9/30/2017 Echo demonstrates LV EF at 60-65% with Grade I diastolic dysfunction; flattened septum consistent with RV pressure/volume overload; and severe (>55 mmHg) pulmonary hypertension. 10/5/2017 CT Chest demonstrates bibasilar infiltrates and right lower lobe consolidation     Psychosocial/Spiritual Needs:  Sister Oriented to Spiritual Health and Social Work Services as part of Palliative Care team.    Decision-Making & Goals of Care:  Discussion/counseling today about goals of care/decisions:   Physical assessment complete prior to calling the patient's group home nurse Burton at [phone]. Burton indicates that five of the patient's siblings are the patient's guardians. She was not aware if one of the guardians is a spokesperson. She also did not have a copy of the guardianship papers. Burton indicated that the patient's sisters Shayna Anand and Audra Anand routinely visit with Blayne. I attempted to call the patient's sister/co-guardian Shayna  "Cheng at [phone] and [phone] with no answer. I was able to reach the patient's sister/co-guardian Audra Anand at  [phone]. She indicated that she and her siblings Otilia Corral, Timbo and Ezequiel are Blayne's guardians. She also explained that Viv rescinded herself from guardianship. She indicated \"Viv caused some problems when Blayne came into the hospital, so I brought in the papers to make certain she didn't cause problems for Blayne\". Audra Palacio explained that the family has been quite satisfied with the care Blayne has had at the group home in [place] and the plan is to have Blayne dismiss to a new group home in [place] which is only a few blocks from Audra Palacio's home. She explained that the plan is to see how Blayne does, and \"try to take it day by day\", but if he needs to go back to the hospital he would go to [facility]. The five guardians agree that the focus is to have Blayne remain comfortable. We discussed this option at length and I introduced her to the possibility of considering the use of the hospice benefit. Audra Palacio acknowledged that she would not to impose upon the group home staff by having more nurses involved in Blayne's care, but after discussing the assistance hospice would aid in she was wholey convinced that her siblings would want Blayne to be on hospice. I offered choice in hospice providers, as Audra Palacio works as a volunteer for [employer] she would prefer to use [facility]. I have notified  Shiloh Guerrero RN and  JAY Cisneros regarding the sister/guardian's request to use [facility] care on dismissal.           Patient has decision-making capacity Unreliable  Patient has no known legal document designating a decision maker. Per policy next of kin is the designated decision maker. See System Informed Consent policy for guidance.  Sister informed me that 5 of the patient's siblings are his guardians including:  Name: Audra" Pia Anand, Relationship: Sister/guardian, Phone(s): [phone].  Name: Shayna Anand, Relationship: Sister/guardian, Phone(s): [phone] or [phone].  Name: Otilia Leos, Relationship: Sister/guardian, Phone(s): [phone]  Name: Timbo Alonzo, Relationship: brother/guardian, Phone(s): [phone].  Name: Ezequiel Alonzo, Relationship: brother/guardian, Phone(s): [phone].    Patient has a completed health care directive available in the chart (Y/N): No  Physician orders for life-sustaining treatment (POLST) form indicates comfort-care only measures  Code Status: Do not resuscitate / Do not intubate     Findings & plan of care discussed with: Hospitalist Romero Schofield MD ; bedside nurse Dea Wayne RN;  Shiloh Guerrero RN and  JAY Cisneros.  Follow-up plan from palliative team: Will follow closely during the patient's hospitalization.   Thank you for involving us in the patient's care.     Pia Mayes MS, RN, CNS, APRN, ACHPN, FAACVPR  Pain and Palliative Care  Pager [phone]  Office [phone]     Time Spent on this Encounter   I spent  50 minutes in assessment of the patient and discussion with the patient's sister/guardian. Another 30 minutes in review of chart, documentation and discussion with the health care team.    Reason for Consult   Reason for consult: I was asked by  Hospitalist Romero Schofield MD to see the patient for severe pulmonary HTN, not able to be treated Likely to be progressive.    Primary Care Physician   Orestes Werner    Chief Complaint   No chief complaint on file.    History is obtained from the electronic health record and patient's sister/guardian    Past Medical History   I have reviewed this patient's medical history and updated it with pertinent information if needed.   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  Past Surgical History:   Procedure Laterality Date     C REVISE KNEE JOINT REPLACE,ALL PARTS  09    LT     C TOTAL KNEE ARTHROPLASTY  05    LT     C TOTAL KNEE ARTHROPLASTY  10/25/06    RT     HC COLOREC CANC SCRN,SCOPY NOT HI RISK  06    5 yr recall     HC REPAIR EPIGASTRIC HERNIA,REDUC  3/3/09       Prior to Admission Medications   Prior to Admission Medications   Prescriptions Last Dose Informant Patient Reported? Taking?   ACETAMINOPHEN 500 MG OR CAPS   Yes No   Sig: w tablets orall as needed for pain maximum 4000mg in 24 hours   AMOXICILLIN 500 MG OR TABS   Yes No   Sig: take 4 capsules by mouth 1 hour before dental appointments( 2000mg)   NEXIUM 40 MG OR CPDR 2017 at Unknown time  Yes Yes   Si CAPSULE DAILY   TEA TREE OIL   Yes No   Sig: apply thin layer to toenails as needed   acetaminophen (TYLENOL) 500 MG tablet 2017 at x1  Yes Yes   Sig: Take 1,000 mg by mouth 2 times daily   acetic acid (VOSOL) 2 % otic solution 2017 at pm  Yes Yes   Sig: Place 5 drops into both ears once a week On Thursday   allopurinol (ZYLOPRIM) 100 MG tablet 2017 at x1  Yes Yes   Sig: Take 100 mg by mouth 2 times daily   cetirizine (ZYRTEC) 10 MG tablet 2017 at am  Yes Yes   Sig: Take 10 mg by mouth daily   cholecalciferol (VITAMIN D) 400 UNIT TABS tablet 2017 at am  Yes Yes   Sig: Take 800 Units by mouth daily   docusate sodium (COLACE) 100 MG capsule 2017 at Unknown time  Yes Yes   Sig: Take 200 mg by mouth every morning    emollient (VANICREAM) cream   Yes Yes   Sig: Apply topically 2 times daily   furosemide (LASIX) 20 MG tablet 2017 at Unknown time  Yes Yes   Sig: Take  20 mg by mouth In afternoon   furosemide (LASIX) 40 MG tablet 9/29/2017 at Unknown time  Yes Yes   Sig: Take 40 mg by mouth every morning   levofloxacin (LEVAQUIN) 500 MG tablet 9/29/2017 at Unknown time  Yes Yes   Sig: Take 500 mg by mouth daily.   levothyroxine (SYNTHROID/LEVOTHROID) 50 MCG tablet 9/29/2017 at Unknown time  Yes Yes   Sig: Take 50 mcg by mouth daily   minocycline (MINOCIN,DYNACIN) 100 MG capsule 9/29/2017 at x1  Yes Yes   Sig: Take 100 mg by mouth 2 times daily.   multivitamin, therapeutic (THERA-VIT) TABS tablet 9/29/2017 at am  Yes Yes   Sig: Take 1 tablet by mouth daily   potassium chloride SA (K-DUR/KLOR-CON M) 10 MEQ CR tablet 9/29/2017 at am  Yes Yes   Sig: Take 10 mEq by mouth daily   tamsulosin (FLOMAX) 0.4 MG 24 hr capsule 9/28/2017 at pm  Yes Yes   Sig: Take 0.4 mg by mouth daily.      Facility-Administered Medications: None     Allergies   Allergies   Allergen Reactions     Ertapenem Swelling       Social History   Living situation: Lives in group home in [place] with plan to dismiss to group home in [place]   Family system: Sisters Shayna, Audra Palacio, and Otilia; brothers Timbo and Ezequiel; and group home staff are supportive  Functional status: Needs help with ADLs  History of substance use/abuse:  reports that he has never smoked. He has never used smokeless tobacco.  has no alcohol history on file.  Cong[employer] affiliation: [employer]    Family History   I have reviewed this patient's family history and updated it with pertinent information if needed.   Family History   Problem Relation Age of Onset     Anesthesia Reaction No family hx of      Blood Disease No family hx of        Review of Systems   Review of systems not obtained due to patient factors - mental status    Physical Exam   Temp:  [96.4  F (35.8  C)-98.4  F (36.9  C)] 96.4  F (35.8  C)  Pulse:  [63-85] 63  Heart Rate:  [83] 83  Resp:  [20-22] 20  BP: (110-148)/(65-68) 110/65  FiO2 (%):  [50 %-100 %] 100 %  SpO2:  [84 %-95 %] 94  %  187 lbs 1.6 oz  GEN:  Morbidly obese male with flattened facial profile and nose of Down syndrome. Sleeping soundly and did not respond during physical assessment. Appears comfortable.  HEENT:  Normocephalic/atraumatic, no scleral icterus, no nasal discharge, mouth moist.  CV:  RRR, S1, S2; no murmurs or other irregularities noted.  Pedal pulses are not palpable.  RESP:  Using accessory muscles to breath. Crackles bilaterally.  ABD:  Rounded, soft, non-tender/non-distended.  +BS  EXT:  Right foot deformity.     M/S:   Deferred.    SKIN:  Pale, warm and dry to touch. Both lower legs and feet are black with chronic appearing deeply cracked plaques on both ankles.   NEURO: Deferred    Data   Results for orders placed or performed during the hospital encounter of 09/29/17   XR Chest Port 1 View    Narrative    PORTABLE CHEST ONE VIEW   9/29/2017 9:53 PM     HISTORY: Shortness of breath.    COMPARISON: 10/18/2006.    FINDINGS: Spinal fixation hardware mid thoracic region to the  visualized portion of the lumbar spine, new since the prior study.  There is pulmonary vascular congestion which may be related to the  patient's supine positioning. There is interstitial perihilar opacity,  new since the prior exam and there is more localized patchy opacity in  the right midlung laterally. Findings may represent edema and right  mid lung consolidation or atelectasis.      Impression    IMPRESSION:   1. New interstitial perihilar opacities not present on 10/18/2006.  This could represent CHF.  2. There is some localized opacity in the right midlung which may be  consolidation or atelectasis.    CHACHO HOLLOWAY MD   US Lower Extremity Venous Bilateral Port    Narrative    US LOWER EXTREMITY VENOUS DUPLEX BILATERAL PORT   9/30/2017 1:34 PM     HISTORY: Shortness of breath. Evaluate for deep venous thrombosis.    COMPARISON: None.    TECHNIQUE: Spectral doppler and waveform analysis were performed on  the bilateral lower  extremity veins.    FINDINGS: The bilateral common femoral, femoral, and popliteal veins  are patent, compressible, and negative for deep venous thrombosis.  Calf veins are segmentally seen but appear negative for DVT where  visualized.      Impression    IMPRESSION:  No evidence for deep venous thrombosis in the bilateral  lower extremity veins.    PEARL CHAVEZ MD   XR Chest Port 1 View    Narrative    XR CHEST PORT 1 VW 10/3/2017 9:42 AM    HISTORY: Worsening respiratory status.    COMPARISON: 9/29/2017    FINDINGS: Interstitial edema and bilateral atelectasis are not  significantly changed from 9/29/2017. No pneumothorax. Stable heart  size.      Impression    IMPRESSION: No significant change from 9/29/2017.    NATALIA COSBY MD   CBC with platelets differential   Result Value Ref Range    WBC 10.0 4.0 - 11.0 10e9/L    RBC Count 3.57 (L) 4.4 - 5.9 10e12/L    Hemoglobin 10.9 (L) 13.3 - 17.7 g/dL    Hematocrit 34.4 (L) 40.0 - 53.0 %    MCV 96 78 - 100 fl    MCH 30.5 26.5 - 33.0 pg    MCHC 31.7 31.5 - 36.5 g/dL    RDW 18.1 (H) 10.0 - 15.0 %    Platelet Count 266 150 - 450 10e9/L    Diff Method Automated Method     % Neutrophils 76.5 %    % Lymphocytes 11.8 %    % Monocytes 8.1 %    % Eosinophils 1.1 %    % Basophils 0.8 %    % Immature Granulocytes 1.7 %    Nucleated RBCs 0 0 /100    Absolute Neutrophil 7.7 1.6 - 8.3 10e9/L    Absolute Lymphocytes 1.2 0.8 - 5.3 10e9/L    Absolute Monocytes 0.8 0.0 - 1.3 10e9/L    Absolute Eosinophils 0.1 0.0 - 0.7 10e9/L    Absolute Basophils 0.1 0.0 - 0.2 10e9/L    Abs Immature Granulocytes 0.2 0 - 0.4 10e9/L    Absolute Nucleated RBC 0.0    Comprehensive metabolic panel   Result Value Ref Range    Sodium 142 133 - 144 mmol/L    Potassium 4.5 3.4 - 5.3 mmol/L    Chloride 106 94 - 109 mmol/L    Carbon Dioxide 31 20 - 32 mmol/L    Anion Gap 5 3 - 14 mmol/L    Glucose 90 70 - 99 mg/dL    Urea Nitrogen 26 7 - 30 mg/dL    Creatinine 0.93 0.66 - 1.25 mg/dL    GFR Estimate 81 >60  mL/min/1.7m2    GFR Estimate If Black >90 >60 mL/min/1.7m2    Calcium 8.1 (L) 8.5 - 10.1 mg/dL    Bilirubin Total 0.2 0.2 - 1.3 mg/dL    Albumin 2.6 (L) 3.4 - 5.0 g/dL    Protein Total 7.5 6.8 - 8.8 g/dL    Alkaline Phosphatase 124 40 - 150 U/L    ALT 28 0 - 70 U/L    AST 26 0 - 45 U/L   Nt probnp inpatient   Result Value Ref Range    N-Terminal Pro BNP Inpatient 1929 (H) 0 - 900 pg/mL   Blood gas venous   Result Value Ref Range    Ph Venous 7.40 7.32 - 7.43 pH    PCO2 Venous 52 (H) 40 - 50 mm Hg    PO2 Venous 25 25 - 47 mm Hg    Bicarbonate Venous 33 (H) 21 - 28 mmol/L    Base Excess Venous 6.6 mmol/L    FIO2 Room Air    Troponin I   Result Value Ref Range    Troponin I ES 0.115 (H) 0.000 - 0.045 ug/L   Troponin I   Result Value Ref Range    Troponin I ES 0.092 (H) 0.000 - 0.045 ug/L   Troponin I   Result Value Ref Range    Troponin I ES 0.048 (H) 0.000 - 0.045 ug/L   Basic metabolic panel   Result Value Ref Range    Sodium 142 133 - 144 mmol/L    Potassium 4.8 3.4 - 5.3 mmol/L    Chloride 107 94 - 109 mmol/L    Carbon Dioxide 31 20 - 32 mmol/L    Anion Gap 4 3 - 14 mmol/L    Glucose 78 70 - 99 mg/dL    Urea Nitrogen 25 7 - 30 mg/dL    Creatinine 0.94 0.66 - 1.25 mg/dL    GFR Estimate 81 >60 mL/min/1.7m2    GFR Estimate If Black >90 >60 mL/min/1.7m2    Calcium 7.8 (L) 8.5 - 10.1 mg/dL   CBC with platelets differential   Result Value Ref Range    WBC 8.7 4.0 - 11.0 10e9/L    RBC Count 3.29 (L) 4.4 - 5.9 10e12/L    Hemoglobin 10.0 (L) 13.3 - 17.7 g/dL    Hematocrit 32.8 (L) 40.0 - 53.0 %     78 - 100 fl    MCH 30.4 26.5 - 33.0 pg    MCHC 30.5 (L) 31.5 - 36.5 g/dL    RDW 18.2 (H) 10.0 - 15.0 %    Platelet Count 246 150 - 450 10e9/L    Diff Method Automated Method     % Neutrophils 72.5 %    % Lymphocytes 13.0 %    % Monocytes 9.9 %    % Eosinophils 1.8 %    % Basophils 1.0 %    % Immature Granulocytes 1.8 %    Nucleated RBCs 0 0 /100    Absolute Neutrophil 6.3 1.6 - 8.3 10e9/L    Absolute Lymphocytes 1.1 0.8 -  5.3 10e9/L    Absolute Monocytes 0.9 0.0 - 1.3 10e9/L    Absolute Eosinophils 0.2 0.0 - 0.7 10e9/L    Absolute Basophils 0.1 0.0 - 0.2 10e9/L    Abs Immature Granulocytes 0.2 0 - 0.4 10e9/L    Absolute Nucleated RBC 0.0    D dimer quantitative   Result Value Ref Range    D Dimer 1.1 (H) 0.0 - 0.50 ug/ml FEU   Procalcitonin   Result Value Ref Range    Procalcitonin 0.42 ng/ml   Comprehensive metabolic panel   Result Value Ref Range    Sodium 140 133 - 144 mmol/L    Potassium 4.4 3.4 - 5.3 mmol/L    Chloride 104 94 - 109 mmol/L    Carbon Dioxide 33 (H) 20 - 32 mmol/L    Anion Gap 3 3 - 14 mmol/L    Glucose 74 70 - 99 mg/dL    Urea Nitrogen 25 7 - 30 mg/dL    Creatinine 1.03 0.66 - 1.25 mg/dL    GFR Estimate 72 >60 mL/min/1.7m2    GFR Estimate If Black 88 >60 mL/min/1.7m2    Calcium 7.7 (L) 8.5 - 10.1 mg/dL    Bilirubin Total 0.2 0.2 - 1.3 mg/dL    Albumin 2.2 (L) 3.4 - 5.0 g/dL    Protein Total 6.5 (L) 6.8 - 8.8 g/dL    Alkaline Phosphatase 100 40 - 150 U/L    ALT 24 0 - 70 U/L    AST 22 0 - 45 U/L   CBC with platelets   Result Value Ref Range    WBC 8.7 4.0 - 11.0 10e9/L    RBC Count 3.29 (L) 4.4 - 5.9 10e12/L    Hemoglobin 9.9 (L) 13.3 - 17.7 g/dL    Hematocrit 32.7 (L) 40.0 - 53.0 %    MCV 99 78 - 100 fl    MCH 30.1 26.5 - 33.0 pg    MCHC 30.3 (L) 31.5 - 36.5 g/dL    RDW 17.9 (H) 10.0 - 15.0 %    Platelet Count 240 150 - 450 10e9/L   Basic metabolic panel   Result Value Ref Range    Sodium 140 133 - 144 mmol/L    Potassium 4.6 3.4 - 5.3 mmol/L    Chloride 104 94 - 109 mmol/L    Carbon Dioxide 31 20 - 32 mmol/L    Anion Gap 5 3 - 14 mmol/L    Glucose 87 70 - 99 mg/dL    Urea Nitrogen 20 7 - 30 mg/dL    Creatinine 0.86 0.66 - 1.25 mg/dL    GFR Estimate 89 >60 mL/min/1.7m2    GFR Estimate If Black >90 >60 mL/min/1.7m2    Calcium 8.0 (L) 8.5 - 10.1 mg/dL   NT proBNP inpatient and ED   Result Value Ref Range    N-Terminal Pro BNP Inpatient 1991 (H) 0 - 900 pg/mL   Lactic acid level STAT   Result Value Ref Range     Lactic Acid 1.2 0.7 - 2.0 mmol/L   Platelet count   Result Value Ref Range    Platelet Count 269 150 - 450 10e9/L   Basic metabolic panel   Result Value Ref Range    Sodium 140 133 - 144 mmol/L    Potassium 4.8 3.4 - 5.3 mmol/L    Chloride 106 94 - 109 mmol/L    Carbon Dioxide 33 (H) 20 - 32 mmol/L    Anion Gap 1 (L) 3 - 14 mmol/L    Glucose 79 70 - 99 mg/dL    Urea Nitrogen 18 7 - 30 mg/dL    Creatinine 0.82 0.66 - 1.25 mg/dL    GFR Estimate >90 >60 mL/min/1.7m2    GFR Estimate If Black >90 >60 mL/min/1.7m2    Calcium 8.1 (L) 8.5 - 10.1 mg/dL   Blood gas venous with oxyhemoglobin   Result Value Ref Range    Ph Venous 7.36 7.32 - 7.43 pH    PCO2 Venous 62 (H) 40 - 50 mm Hg    PO2 Venous 30 25 - 47 mm Hg    Bicarbonate Venous 35 (H) 21 - 28 mmol/L    FIO2 Room Air     Oxyhemoglobin Venous 48 %    Base Excess Venous 7.6 mmol/L   Procalcitonin   Result Value Ref Range    Procalcitonin 0.59 ng/ml   Blood gas venous with oxyhemoglobin   Result Value Ref Range    Ph Venous 7.35 7.32 - 7.43 pH    PCO2 Venous 66 (H) 40 - 50 mm Hg    PO2 Venous 32 25 - 47 mm Hg    Bicarbonate Venous 36 (H) 21 - 28 mmol/L    FIO2 10     Oxyhemoglobin Venous 50 %    Base Excess Venous 9.4 mmol/L   Blood gas venous with oxyhemoglobin   Result Value Ref Range    Ph Venous 7.39 7.32 - 7.43 pH    PCO2 Venous 56 (H) 40 - 50 mm Hg    PO2 Venous 33 25 - 47 mm Hg    Bicarbonate Venous 34 (H) 21 - 28 mmol/L    FIO2 Room Air     Oxyhemoglobin Venous 57 %    Base Excess Venous 7.8 mmol/L     *Note: Due to a large number of results and/or encounters for the requested time period, some results have not been displayed. A complete set of results can be found in Results Review.

## 2017-10-05 NOTE — PLAN OF CARE
"Problem: Patient Care Overview  Goal: Plan of Care/Patient Progress Review  Outcome: No Change     O2 needs labile. 85-99% on \"blow by\" O2 at 10L. All other VSS, afebrile. Pt is nonverbal, shows no nonverbal signs of pain. Alert and cooperative. Up to chair for dinner with lift, A2. Continent of urine. No BM, passing gas. Dressings changed to R posterior calf, Rt buttocks. Tele SR. Overnight Oximeter study initiated. PSC at bedside for safety.       "

## 2017-10-05 NOTE — PROGRESS NOTES
Overnight oximetry done. Patient on cool aerosol blow-by 100% 15 lpm throughout night. Per nursing patient awake all night, restless.     Jolly Grier, RT 10/5/2017, 5:53 AM

## 2017-10-05 NOTE — PROGRESS NOTES
SWS:  D: discharge planning  A/P: NOE spoke with [facility] who states that family has decided on hospice and would like to use [facility] at [place]. NOE left message with Coy the LPN at the  requesting return call to discuss and coordinate pt return.  NOE also contacted [facility] and state that unless pt has a PCP through [facility] they are not taking outside referrals.  Pt face sheet does not list pt PCP. NOE will discuss with Coy from pt  when she returns the call. If pt has an [facility] provider BRITTANI will contact [facility] other wise NOE will discuss with pt siblings the other agencies.

## 2017-10-05 NOTE — PROGRESS NOTES
Care Coordination:  Call received from Burton TAYLOR at  for update on pt plan of care.  updated that pt is awaiting a Palliative consult for goals of care and likely not discharging today.  expecting pt to dc back with new overnight oxygen.  Arrangements have been made to accommodate this. Overnight Oximetry has been completed. They would like clear parameters for oxygen saturations to be alarmed with for pt and would like update on Palliative conversation. They will transport on dc. Pt will need to be discharged back to [facility] home before the weekend if possible.    Palliative/MD to update  with plan of care. Please call CLAUDIA Manrique at [phone]. Bedside RN updated.    Shiloh Guerrero RN CTS

## 2017-10-06 ENCOUNTER — DOCUMENTATION ONLY (OUTPATIENT)
Dept: OTHER | Facility: CLINIC | Age: 67
End: 2017-10-06

## 2017-10-06 PROBLEM — Z71.89 ACP (ADVANCE CARE PLANNING): Chronic | Status: ACTIVE | Noted: 2017-10-06

## 2017-10-06 LAB — PLATELET # BLD AUTO: 281 10E9/L (ref 150–450)

## 2017-10-06 PROCEDURE — A9270 NON-COVERED ITEM OR SERVICE: HCPCS | Mod: GY | Performed by: INTERNAL MEDICINE

## 2017-10-06 PROCEDURE — 99233 SBSQ HOSP IP/OBS HIGH 50: CPT | Performed by: INTERNAL MEDICINE

## 2017-10-06 PROCEDURE — 25000125 ZZHC RX 250: Performed by: INTERNAL MEDICINE

## 2017-10-06 PROCEDURE — 85049 AUTOMATED PLATELET COUNT: CPT | Performed by: INTERNAL MEDICINE

## 2017-10-06 PROCEDURE — 94640 AIRWAY INHALATION TREATMENT: CPT

## 2017-10-06 PROCEDURE — 25000128 H RX IP 250 OP 636: Performed by: INTERNAL MEDICINE

## 2017-10-06 PROCEDURE — 40000914 ZZH STATISTIC SITTER, DAY HOURS

## 2017-10-06 PROCEDURE — 25000132 ZZH RX MED GY IP 250 OP 250 PS 637: Mod: GY | Performed by: INTERNAL MEDICINE

## 2017-10-06 PROCEDURE — 94640 AIRWAY INHALATION TREATMENT: CPT | Mod: 76

## 2017-10-06 PROCEDURE — 40000275 ZZH STATISTIC RCP TIME EA 10 MIN

## 2017-10-06 PROCEDURE — 36415 COLL VENOUS BLD VENIPUNCTURE: CPT | Performed by: INTERNAL MEDICINE

## 2017-10-06 PROCEDURE — 40000915 ZZH STATISTIC SITTER, EVENING HOURS

## 2017-10-06 PROCEDURE — 12000007 ZZH R&B INTERMEDIATE

## 2017-10-06 RX ORDER — LORAZEPAM 2 MG/ML
.5-1 INJECTION INTRAMUSCULAR EVERY 4 HOURS PRN
Status: DISCONTINUED | OUTPATIENT
Start: 2017-10-06 | End: 2017-10-10 | Stop reason: HOSPADM

## 2017-10-06 RX ADMIN — SENNOSIDES AND DOCUSATE SODIUM 1 TABLET: 8.6; 5 TABLET ORAL at 10:36

## 2017-10-06 RX ADMIN — IPRATROPIUM BROMIDE AND ALBUTEROL SULFATE 3 ML: .5; 3 SOLUTION RESPIRATORY (INHALATION) at 19:10

## 2017-10-06 RX ADMIN — LEVOFLOXACIN 500 MG: 5 INJECTION, SOLUTION INTRAVENOUS at 14:08

## 2017-10-06 RX ADMIN — MINOCYCLINE HYDROCHLORIDE 100 MG: 100 CAPSULE ORAL at 20:00

## 2017-10-06 RX ADMIN — ACETAMINOPHEN 1000 MG: 500 TABLET, FILM COATED ORAL at 20:00

## 2017-10-06 RX ADMIN — TAMSULOSIN HYDROCHLORIDE 0.4 MG: 0.4 CAPSULE ORAL at 19:56

## 2017-10-06 RX ADMIN — IPRATROPIUM BROMIDE AND ALBUTEROL SULFATE 3 ML: .5; 3 SOLUTION RESPIRATORY (INHALATION) at 15:29

## 2017-10-06 RX ADMIN — ACETAMINOPHEN 1000 MG: 500 TABLET, FILM COATED ORAL at 10:36

## 2017-10-06 RX ADMIN — IPRATROPIUM BROMIDE AND ALBUTEROL SULFATE 3 ML: .5; 3 SOLUTION RESPIRATORY (INHALATION) at 07:34

## 2017-10-06 RX ADMIN — ENOXAPARIN SODIUM 40 MG: 40 INJECTION SUBCUTANEOUS at 20:03

## 2017-10-06 RX ADMIN — LORAZEPAM 0.5 MG: 2 INJECTION INTRAMUSCULAR; INTRAVENOUS at 02:24

## 2017-10-06 RX ADMIN — PANTOPRAZOLE SODIUM 40 MG: 40 TABLET, DELAYED RELEASE ORAL at 07:05

## 2017-10-06 RX ADMIN — MINOCYCLINE HYDROCHLORIDE 100 MG: 100 CAPSULE ORAL at 10:35

## 2017-10-06 RX ADMIN — ALLOPURINOL 100 MG: 100 TABLET ORAL at 20:00

## 2017-10-06 RX ADMIN — FUROSEMIDE 40 MG: 40 TABLET ORAL at 10:35

## 2017-10-06 RX ADMIN — PREDNISONE 20 MG: 20 TABLET ORAL at 10:36

## 2017-10-06 RX ADMIN — IPRATROPIUM BROMIDE AND ALBUTEROL SULFATE 3 ML: .5; 3 SOLUTION RESPIRATORY (INHALATION) at 11:33

## 2017-10-06 RX ADMIN — LEVOTHYROXINE SODIUM 50 MCG: 25 TABLET ORAL at 10:36

## 2017-10-06 RX ADMIN — ASPIRIN 325 MG ORAL TABLET 325 MG: 325 PILL ORAL at 10:35

## 2017-10-06 RX ADMIN — ALLOPURINOL 100 MG: 100 TABLET ORAL at 10:36

## 2017-10-06 RX ADMIN — SENNOSIDES AND DOCUSATE SODIUM 1 TABLET: 8.6; 5 TABLET ORAL at 20:00

## 2017-10-06 RX ADMIN — CETIRIZINE HYDROCHLORIDE 10 MG: 10 TABLET, FILM COATED ORAL at 10:35

## 2017-10-06 RX ADMIN — DOCUSATE SODIUM 200 MG: 100 CAPSULE, LIQUID FILLED ORAL at 10:35

## 2017-10-06 RX ADMIN — POTASSIUM CHLORIDE 10 MEQ: 750 TABLET, FILM COATED, EXTENDED RELEASE ORAL at 10:35

## 2017-10-06 NOTE — CONSULTS
"BRIEF NUTRITION ASSESSMENT      REASON FOR ASSESSMENT:  LOS    NUTRITION HISTORY:  - Patient with a h/o down syndrome, non-verbal.  - Resides in a group home.     CURRENT DIET AND INTAKE:  Diet:  Cardiac/No caffeine    SLP completed bedside swallow evaluation and recommended regular textures with thin liquids.  % meal consumption since 10/2.  Per discussion with team during rounds and review of chart, patient will be d/c'ing on Hospice.      ANTHROPOMETRICS:  Height: 4' 10\"  Weight: 187#  BMI: 39 kg/m2  IBW:  101#  Weight Status: Obesity Grade II BMI 35-39.9  %IBW: 185%  Weight History:  Wt Readings from Last 10 Encounters:   10/05/17 84.9 kg (187 lb 1.6 oz)   06/08/09 101 kg (222 lb 11.2 oz)   05/27/09 117.9 kg (260 lb)   10/02/06 91.9 kg (202 lb 8 oz)   - No recent wt trending available though no documentation of wt loss or inadequate oral intakes from Care Coordinator or MD.  Likely would be hard to determine wt trending given new LE edema/CHF.      LABS:  Labs noted    MALNUTRITION:  Patient does not meet two of the following criteria necessary for diagnosing malnutrition: significant weight loss, reduced intake, subcutaneous fat loss, muscle loss or fluid retention    NUTRITION INTERVENTION:  Nutrition Diagnosis:  No nutrition diagnosis at this time.    Implementation:  Nutrition Education:  Per Provider order if indicated.      FOLLOW UP/MONITORING:   RD sign-off given Hospice at d/c.  Please page/consult if needs arise.         Marilee Cleveland RD, LD  Clinical Dietitian  3rd floor/ICU: [phone]  All other floors: [phone]  Weekend/holiday: [phone]  "

## 2017-10-06 NOTE — PLAN OF CARE
Problem: Cardiac: Heart Failure (Adult)  Goal: Signs and Symptoms of Listed Potential Problems Will be Absent, Minimized or Managed (Cardiac: Heart Failure)  Signs and symptoms of listed potential problems will be absent, minimized or managed by discharge/transition of care (reference Cardiac: Heart Failure (Adult) CPG).   Outcome: No Change  VSS. O2 sats remaining above 90% on blow by at 10LPM. Pt. Showing nonverbal signs of pain. PRN tylenol given. Bladder scanned pt. At greater than 975ml. Pt. Voided 1,225 with encouragement. Pt. Needs reminding and encouragement to use the urinal. Up to chair for meals. Repositioned, jose miguel care per orders.

## 2017-10-06 NOTE — PROGRESS NOTES
SWS:  NOE notified that pt PCP is a [facility] doctor and that [place] in not in the [facility] service area. [place] is also not in the [place] [facility] area. NOE spoke with sister Audra Palacio who does not care what hospice agency pt is open to and requests that SWS contact an agency in the [place] area. NOE contacted [facility] and they are able to provide care in [place]. Referral information faxed and [facility] will contact pt siblings and group home to coordinate care.  NOE updated Burton at the  and will follow up on Monday.

## 2017-10-06 NOTE — PLAN OF CARE
"Problem: Patient Care Overview  Goal: Plan of Care/Patient Progress Review  Outcome: Improving  VS /68 (BP Location: Right arm)  Pulse 63  Temp 97  F (36.1  C) (Axillary)  Resp 20  Ht 1.473 m (4' 10\")  Wt 84.9 kg (187 lb 1.6 oz)  SpO2 92%  BMI 39.1 kg/m2  Lung sounds diminished  O2 room air with 12 L humidified oxygen blow by  Tele SR            Bowel sounds active  Tolerating low fat/low salt diet; total feed needed at meals  IVF saline locked  Activity up with mechanical lift assist  Pain denies  Patient/family centered care brother and sister in law updated on plan of care  Discharge plan awaiting coordination of hospice, group home will not accept patient's return on the weekend so anticipate Monday at earliest date.          "

## 2017-10-06 NOTE — PROGRESS NOTES
[facility]  Hospitalist Progress Note  Romero Schofield MD 10/06/2017    Reason for Stay (Diagnosis): Shortness of breath         Assessment and Plan:      Summary of Stay:  Jerald Alonzo is a 66 year old Down Syndrome male with severe cognitive delay who came to attention on 9/29/2017 with increased work of breathing for the preceding week. He was noted to have bilateral infiltrates assoc with measured hypoxia and was started on diuretics for presumed CHF. However, Echo does not support LV failure (neither systolic or diastolic) and it is suspected that severe pulmonary hypertension may have been the cause of his hypoxia as he is felt to likely have untreated FROYLAN.  He was initially treated with supplemental O2, nebs and a trial of steroids.  He has been afebrile but Procal is mildly elevated.  LE US was negative but TTE did suggest right heart failure as noted above.  CT pulmonary angiogram was obtained on 10/5 with some light sedation to expand his workup and he did appear to have some ground glass infiltrates with small effusions suggestive of pneumonia.  He has been started on IV levaquin empirically.    It remains to be seen how much Blayne will improve.  He currently is requiring high rate blow by O2 but his exact O2 requirement is impossible to determine as he doesn't tolerate nasal cannula and pulls off oximeters.  Palliative care has been consulted.  His hypoxia may improve somewhat with treatment for pneumonia but it seems likely that he has severe pulmonary HTN/untreated FROYLAN (certainly could not tolerate cpap, family agrees) which is the underlying primary issue.      After further discussion with palliative care and his family the plan is for hospice enrollment based on his/their goals of care.  Social work is working on appropriate disposition.     Problem List:   1. Acute hypoxic resp failure due to severe pulmonary HTN, untreated FROYLAN and suspected pneumonia.  Pt's echo  shows possible early Diastolic dysfunction and preserved LV EF, with normal appearing valves and prominent right heart failure. Infectious eval showed WBC normal, and procal 0.42, no fever but ground glass on CT scan so treating empirically for pneumonia as therapeutic options are limited otherwise. D-dimer noted to be elevated but LE US was negative and CTPA negative for PE.  Also, it is suspected that much of what we are seeing is chronic.  --continue nebs, daily prednisone given some CO2 retention on VBG (4-5 day burst planned).  --will need to DC w/ blow-by O2 for comfort upon discharge w/ hospice enrollment.    2. Severe pulmonary hypertension: Likely due to FROYLAN, but also possibly worse due to current lung inflammation. It is likely that this accounts for much of pt's hypoxia. I am not convinced that further IV diuretics will help much. I suspect that he has quite severe FROYLAN, and does tolerate oxygen blow-by at bedtime.  CTPA negative for PE as a potential etiology.    3. Chronic LE edema. Appears very well-managed with compression. Has dark discoloration of his bilateral LE which is reportedly chronic.    4.  Down's syndrome w/ severe cognitive impairment, intolerant to basic medical treatments such as IV lines, oximeters, nasal cannulas and certainly would not tolerate cpap/bipap.          DVT Prophylaxis: Enoxaparin (Lovenox) SQ  Code Status: DNR / DNI  Discharge Dispo: back to group home expected.   Estimated Disch Date / # of Days until Disch:  Probably Monday to a group home w/ hospice enrollment.  SW following to coordinate this.  Would really be ready once appropriate dispo is found (home w/ blow by O2, comfort meds and consider completing 5-7 days total of levaquin).        Interval History (Subjective):      Smiles  Respiratory status about the same  Discussed via phone with his sister, Audra Palacio, to confirm the plan.  They are trying to sort out if he will be allowed to return to his group home vs  "need to transition to a new one.                  Physical Exam:      Last Vital Signs:  /68 (BP Location: Right arm)  Pulse 63  Temp 97  F (36.1  C) (Axillary)  Resp 20  Ht 1.473 m (4' 10\")  Wt 84.9 kg (187 lb 1.6 oz)  SpO2 92%  BMI 39.1 kg/m2    I/O last 3 completed shifts:  In: 1360 [P.O.:860; IV Piggyback:500]  Out: 2400 [Urine:2400]    Constitutional: Alert and interactive. Appears cheerful.  Pt is a heavy-set, very short male with Down syndrome features.   Respiratory: Some increased work of breathing.  Mild bilat dry crackles.     Cardiovascular: Regular rate and rhythm, normal S1 and S2, and no murmur noted   Abdomen: Normal bowel sounds, soft, non-distended, non-tender   Skin: No rashes, no cyanosis, dry to touch   Neuro: Alert and oriented x3, no weakness, numbness, memory loss   Extremities: He has significant woody plaque-like change over the skin of his ankles bilat. This is soft and clearly very chronic and clean. Very well cared-for. He also has a lot of bluish discoloration that has been ascribed to being chronic.   Other(s):        All other systems: Negative          Medications:      All current medications were reviewed with changes reflected in problem list.         Data:      All new lab and imaging data was reviewed.   Labs/Imaging:  Results for orders placed or performed during the hospital encounter of 09/29/17 (from the past 24 hour(s))   Platelet count   Result Value Ref Range    Platelet Count 281 150 - 450 10e9/L       "

## 2017-10-06 NOTE — PLAN OF CARE
Problem: Patient Care Overview  Goal: Plan of Care/Patient Progress Review  Outcome: No Change  Paged nocturnist at 0120:  Pt calling out.  Nonverbal patient.  Have repositioned, has voided.  His Ativan was d/c'd but reportedly worked well yesterday for him.  Is there anything you would like to try for his restlessness? Thank you.    New PRN orders for lorazepam received.   Placed urinal and will urinate when told it is ok.  Will call out at times.   at bedside.

## 2017-10-07 PROCEDURE — 40000914 ZZH STATISTIC SITTER, DAY HOURS

## 2017-10-07 PROCEDURE — 25000125 ZZHC RX 250: Performed by: INTERNAL MEDICINE

## 2017-10-07 PROCEDURE — 94640 AIRWAY INHALATION TREATMENT: CPT | Mod: 76

## 2017-10-07 PROCEDURE — 40000274 ZZH STATISTIC RCP CONSULT EA 30 MIN

## 2017-10-07 PROCEDURE — 25000132 ZZH RX MED GY IP 250 OP 250 PS 637: Mod: GY | Performed by: INTERNAL MEDICINE

## 2017-10-07 PROCEDURE — 40000915 ZZH STATISTIC SITTER, EVENING HOURS

## 2017-10-07 PROCEDURE — 40000275 ZZH STATISTIC RCP TIME EA 10 MIN

## 2017-10-07 PROCEDURE — 12000007 ZZH R&B INTERMEDIATE

## 2017-10-07 PROCEDURE — 94640 AIRWAY INHALATION TREATMENT: CPT

## 2017-10-07 PROCEDURE — A9270 NON-COVERED ITEM OR SERVICE: HCPCS | Mod: GY | Performed by: INTERNAL MEDICINE

## 2017-10-07 PROCEDURE — 99232 SBSQ HOSP IP/OBS MODERATE 35: CPT | Performed by: INTERNAL MEDICINE

## 2017-10-07 PROCEDURE — 25000128 H RX IP 250 OP 636: Performed by: INTERNAL MEDICINE

## 2017-10-07 PROCEDURE — 40000916 ZZH STATISTIC SITTER, NIGHT HOURS

## 2017-10-07 RX ORDER — LEVOFLOXACIN 500 MG/1
500 TABLET, FILM COATED ORAL DAILY
Status: DISCONTINUED | OUTPATIENT
Start: 2017-10-08 | End: 2017-10-10 | Stop reason: HOSPADM

## 2017-10-07 RX ORDER — IPRATROPIUM BROMIDE AND ALBUTEROL SULFATE 2.5; .5 MG/3ML; MG/3ML
3 SOLUTION RESPIRATORY (INHALATION)
Status: DISCONTINUED | OUTPATIENT
Start: 2017-10-07 | End: 2017-10-10 | Stop reason: HOSPADM

## 2017-10-07 RX ADMIN — ACETAMINOPHEN 1000 MG: 500 TABLET, FILM COATED ORAL at 09:19

## 2017-10-07 RX ADMIN — DOCUSATE SODIUM 200 MG: 100 CAPSULE, LIQUID FILLED ORAL at 09:19

## 2017-10-07 RX ADMIN — MINOCYCLINE HYDROCHLORIDE 100 MG: 100 CAPSULE ORAL at 20:16

## 2017-10-07 RX ADMIN — FUROSEMIDE 40 MG: 40 TABLET ORAL at 09:20

## 2017-10-07 RX ADMIN — PREDNISONE 20 MG: 20 TABLET ORAL at 09:19

## 2017-10-07 RX ADMIN — POTASSIUM CHLORIDE 10 MEQ: 750 TABLET, FILM COATED, EXTENDED RELEASE ORAL at 09:20

## 2017-10-07 RX ADMIN — ENOXAPARIN SODIUM 40 MG: 40 INJECTION SUBCUTANEOUS at 20:16

## 2017-10-07 RX ADMIN — MINOCYCLINE HYDROCHLORIDE 100 MG: 100 CAPSULE ORAL at 09:19

## 2017-10-07 RX ADMIN — ALLOPURINOL 100 MG: 100 TABLET ORAL at 09:20

## 2017-10-07 RX ADMIN — TAMSULOSIN HYDROCHLORIDE 0.4 MG: 0.4 CAPSULE ORAL at 20:16

## 2017-10-07 RX ADMIN — IPRATROPIUM BROMIDE AND ALBUTEROL SULFATE 3 ML: .5; 3 SOLUTION RESPIRATORY (INHALATION) at 16:03

## 2017-10-07 RX ADMIN — ACETAMINOPHEN 650 MG: 325 TABLET, FILM COATED ORAL at 13:55

## 2017-10-07 RX ADMIN — LORAZEPAM 0.5 MG: 2 INJECTION INTRAMUSCULAR; INTRAVENOUS at 13:56

## 2017-10-07 RX ADMIN — IPRATROPIUM BROMIDE AND ALBUTEROL SULFATE 3 ML: .5; 3 SOLUTION RESPIRATORY (INHALATION) at 11:52

## 2017-10-07 RX ADMIN — ASPIRIN 325 MG ORAL TABLET 325 MG: 325 PILL ORAL at 09:20

## 2017-10-07 RX ADMIN — ACETAMINOPHEN 1000 MG: 500 TABLET, FILM COATED ORAL at 20:16

## 2017-10-07 RX ADMIN — SENNOSIDES AND DOCUSATE SODIUM 1 TABLET: 8.6; 5 TABLET ORAL at 09:20

## 2017-10-07 RX ADMIN — LEVOTHYROXINE SODIUM 50 MCG: 25 TABLET ORAL at 09:20

## 2017-10-07 RX ADMIN — CETIRIZINE HYDROCHLORIDE 10 MG: 10 TABLET, FILM COATED ORAL at 09:20

## 2017-10-07 RX ADMIN — ALLOPURINOL 100 MG: 100 TABLET ORAL at 20:16

## 2017-10-07 RX ADMIN — IPRATROPIUM BROMIDE AND ALBUTEROL SULFATE 3 ML: .5; 3 SOLUTION RESPIRATORY (INHALATION) at 07:19

## 2017-10-07 RX ADMIN — PANTOPRAZOLE SODIUM 40 MG: 40 TABLET, DELAYED RELEASE ORAL at 09:19

## 2017-10-07 NOTE — PLAN OF CARE
Problem: Cardiac: Heart Failure (Adult)  Goal: Signs and Symptoms of Listed Potential Problems Will be Absent, Minimized or Managed (Cardiac: Heart Failure)  Signs and symptoms of listed potential problems will be absent, minimized or managed by discharge/transition of care (reference Cardiac: Heart Failure (Adult) CPG).   Outcome: Improving  [id number]: VSS afebrile, on cool aerosol blow at 11 LPM, sats low 90s. Tele SR, HR 90s. Lung sounds diminished. Cont on IV Levaquin for PNA, prednisone, and Po lasix. PIV saline locked. Turn and repo encouraged. Incontinent at times. Blister to groin, scratches to inner thighs, wound to buttocks, WOCN following, see note for recommendation. PSC at bedside for safety. Palliative consult in place, referral information faxed to [facility] hosp[facility]. Will cont with POC.

## 2017-10-07 NOTE — PROGRESS NOTES
"[facility]     Date: 10/07/2017  Admission Dx: CHF  Pulmonary History: None  Home Nebulizer/MDI Use: None  Home Oxygen: None  Acuity Level (RCAT flow sheet): 2  Aerosol Therapy initiated: Duoneb Q4 w/a, Albuterol Q2 prn  Pulmonary Hygiene initiated: Cough and deep breathing techniques, if pt will participate.  Volume Expansion initiated: IS TID, if pt will participate  Current Oxygen Requirements: Aerosol blow by, 100%@ 10 LPM  Current SpO2:92%  Re-evaluation date: 10/10/2017  Patient Education: Pt has Down Syndrome, cognitive dysfunction    See \"RT Assessments\" flow sheet for patient assessment scoring and Acuity Level Details.    Vital signs:  Temp: 97.3  F (36.3  C) Temp src: Axillary BP: 123/54   Heart Rate: 61 Resp: 22 SpO2: 92 % O2 Device: Cool Aerosol Oxygen Delivery: 10 LPM Height: 147.3 cm (4' 9.99\") Weight: 83.8 kg (184 lb 12.8 oz)  Estimated body mass index is 38.63 kg/(m^2) as calculated from the following:    Height as of this encounter: 1.473 m (4' 9.99\").    Weight as of this encounter: 83.8 kg (184 lb 12.8 oz).      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    Hospitalized     Other bilateral bundle branch block      Other specified complications        PAST SURGICAL HISTORY:   Past Surgical History:   Procedure Laterality Date     C REVISE KNEE JOINT REPLACE,ALL PARTS  06/24/09    LT     C TOTAL KNEE ARTHROPLASTY  03/03/05    LT     C TOTAL KNEE ARTHROPLASTY  10/25/06    RT     HC COLOREC CANC SCRN,SCOPY NOT HI RISK  05/17/06    5 yr recall     HC REPAIR EPIGASTRIC HERNIA,REDUC  3/3/09       FAMILY HISTORY:   Family History   Problem Relation Age of Onset     Anesthesia Reaction No family hx of      Blood Disease No family hx of        SOCIAL HISTORY:   Social History   Substance Use Topics     Smoking status: " Never Smoker     Smokeless tobacco: Never Used     Alcohol use Not on file     Study Result      CT CHEST PULMONARY EMBOLISM WITH CONTRAST October 5, 2017 11:09 AM      HISTORY: Rule out pulmonary embolus, evaluate for fibrotic changes or  infectious appearing infiltrate.      TECHNIQUE: Thin section axial images are performed from the thoracic  inlet to the lung bases utilizing 70 mL of Isovue 370 IV contrast  without adverse event. Coronal reformatted images are also generated.  Radiation dose for this scan was reduced using automated exposure  control, adjustment of the mA and/or kV according to patient size, or  iterative reconstruction technique.     FINDINGS:      Chest: Areas of infiltrate and consolidation are noted in the lower  lobes in addition to bandlike areas of atelectasis in the lingula and  right upper lobe. Pneumonia is possible. Trace pleural effusions are  noted bilaterally. No pericardial fluid. Heart is normal in size. The  esophagus is unremarkable. Thyroid gland appears prominent in size. No  enlarged axillary lymph nodes. Probable reactive adenopathy in the  mediastinum and hilar regions. A prominent right tracheoesophageal  lymph node measures 2.1 x 1.4 cm on series 4, image 32 in the right  posterior upper mediastinum. No prior exam is available to assess for  chronicity. Remaining lymph nodes are smaller in size. A left  supraclavicular lymph node is also present measuring 1.5 cm on series  4, image 10. No evidence of pulmonary embolism however the study is  limited due to patient respiratory motion. Thoracic aorta is  unremarkable. Limited images upper abdomen demonstrate elevation of  the right hemidiaphragm. Bone window examination demonstrates  degenerative spine changes with spinal fusion from approximately T6  through the upper lumbar spine.         IMPRESSION:  1. Bibasilar infiltrates and right lower lobe consolidation with trace  pleural effusions. Pneumonia is possible. No  evidence of pulmonary  embolism. Thoracic aorta is unremarkable.  2. Probable reactive lymph nodes with enlarged upper mediastinal node  in the tracheoesophageal region and also prominent left  supraclavicular lymph node. Remaining lymph nodes are mildly prominent  but do not exceed size criteria for abnormality.  3. Extensive spinal fusion.     KIARRA BARRON MD          Prior to Admission medications    Medication Sig Last Dose Taking? Auth Provider   acetaminophen (TYLENOL) 500 MG tablet Take 1,000 mg by mouth 2 times daily 9/29/2017 at x1 Yes Unknown, Entered By History   emollient (VANICREAM) cream Apply topically 2 times daily   Yes Unknown, Entered By History   allopurinol (ZYLOPRIM) 100 MG tablet Take 100 mg by mouth 2 times daily 9/29/2017 at x1 Yes Unknown, Entered By History   furosemide (LASIX) 20 MG tablet Take 20 mg by mouth In afternoon 9/29/2017 at Unknown time Yes Unknown, Entered By History   acetic acid (VOSOL) 2 % otic solution Place 5 drops into both ears once a week On Thursday 9/28/2017 at pm Yes Unknown, Entered By History   levothyroxine (SYNTHROID/LEVOTHROID) 50 MCG tablet Take 50 mcg by mouth daily 9/29/2017 at Unknown time Yes Unknown, Entered By History   furosemide (LASIX) 40 MG tablet Take 40 mg by mouth every morning 9/29/2017 at Unknown time Yes Unknown, Entered By History   potassium chloride SA (K-DUR/KLOR-CON M) 10 MEQ CR tablet Take 10 mEq by mouth daily 9/29/2017 at am Yes Unknown, Entered By History   multivitamin, therapeutic (THERA-VIT) TABS tablet Take 1 tablet by mouth daily 9/29/2017 at am Yes Unknown, Entered By History   cholecalciferol (VITAMIN D) 400 UNIT TABS tablet Take 800 Units by mouth daily 9/29/2017 at am Yes Unknown, Entered By History   cetirizine (ZYRTEC) 10 MG tablet Take 10 mg by mouth daily 9/29/2017 at am Yes Unknown, Entered By History   docusate sodium (COLACE) 100 MG capsule Take 200 mg by mouth every morning  9/29/2017 at Unknown time Yes Reported,  Patient   levofloxacin (LEVAQUIN) 500 MG tablet Take 500 mg by mouth daily. 9/29/2017 at Unknown time Yes Reported, Patient   minocycline (MINOCIN,DYNACIN) 100 MG capsule Take 100 mg by mouth 2 times daily. 9/29/2017 at x1 Yes Reported, Patient   tamsulosin (FLOMAX) 0.4 MG 24 hr capsule Take 0.4 mg by mouth daily. 9/28/2017 at pm Yes Reported, Patient   NEXIUM 40 MG OR CPDR 1 CAPSULE DAILY 9/29/2017 at Unknown time Yes Reported, Patient   ACETAMINOPHEN 500 MG OR CAPS w tablets orall as needed for pain maximum 4000mg in 24 hours     Reported, Patient   AMOXICILLIN 500 MG OR TABS take 4 capsules by mouth 1 hour before dental appointments( 2000mg)     Reported, Patient   TEA TREE OIL apply thin layer to toenails as needed     Reported, Patient      Federico Cronin RT  10/7/2017

## 2017-10-07 NOTE — PLAN OF CARE
Problem: Patient Care Overview  Goal: Interdisciplinary Rounds/Family Conf  Outcome: No Change  Pain: No c/o pain  LOC: Hx Down's syndrome. Confused/forgetful.  Restless at times.  Sitter at bedside.  Mobility: Assist of 2 and lift.  Family says he can walk with a walker at baseline.   Lungs: RUBI, diminished.  92% with blow by oxygen.  Tele: SR  GI: low fat low chol diet  : Incont at times.  Will sometimes use urinal with assist.  IV: PIV saline locked  Other: Dark, suad L/E at baseline.  Excoriation inner thighs and buttocks, right ankle.  Continue PRN levaquin, lasix, prednisone per orders.  Palliative following.  D/C to group home when ready    15:45 Text page sent to MD to ask for PT eval per family request.  Family states pt walks with walker at baseline.

## 2017-10-08 ENCOUNTER — APPOINTMENT (OUTPATIENT)
Dept: PHYSICAL THERAPY | Facility: CLINIC | Age: 67
DRG: 314 | End: 2017-10-08
Attending: INTERNAL MEDICINE
Payer: MEDICARE

## 2017-10-08 LAB
CREAT SERPL-MCNC: 0.92 MG/DL (ref 0.66–1.25)
GFR SERPL CREATININE-BSD FRML MDRD: 83 ML/MIN/1.7M2

## 2017-10-08 PROCEDURE — 25000132 ZZH RX MED GY IP 250 OP 250 PS 637: Mod: GY | Performed by: INTERNAL MEDICINE

## 2017-10-08 PROCEDURE — 36415 COLL VENOUS BLD VENIPUNCTURE: CPT | Performed by: INTERNAL MEDICINE

## 2017-10-08 PROCEDURE — 40000275 ZZH STATISTIC RCP TIME EA 10 MIN

## 2017-10-08 PROCEDURE — 40000914 ZZH STATISTIC SITTER, DAY HOURS

## 2017-10-08 PROCEDURE — A9270 NON-COVERED ITEM OR SERVICE: HCPCS | Mod: GY | Performed by: INTERNAL MEDICINE

## 2017-10-08 PROCEDURE — 12000000 ZZH R&B MED SURG/OB

## 2017-10-08 PROCEDURE — 99207 ZZC CDG-MDM COMPONENT: MEETS LOW - DOWN CODED: CPT | Performed by: INTERNAL MEDICINE

## 2017-10-08 PROCEDURE — 40000193 ZZH STATISTIC PT WARD VISIT

## 2017-10-08 PROCEDURE — 97161 PT EVAL LOW COMPLEX 20 MIN: CPT | Mod: GP

## 2017-10-08 PROCEDURE — 40000915 ZZH STATISTIC SITTER, EVENING HOURS

## 2017-10-08 PROCEDURE — 25000125 ZZHC RX 250: Performed by: INTERNAL MEDICINE

## 2017-10-08 PROCEDURE — 94640 AIRWAY INHALATION TREATMENT: CPT | Mod: 76

## 2017-10-08 PROCEDURE — 97530 THERAPEUTIC ACTIVITIES: CPT | Mod: GP

## 2017-10-08 PROCEDURE — 97116 GAIT TRAINING THERAPY: CPT | Mod: GP

## 2017-10-08 PROCEDURE — 99232 SBSQ HOSP IP/OBS MODERATE 35: CPT | Performed by: INTERNAL MEDICINE

## 2017-10-08 PROCEDURE — 82565 ASSAY OF CREATININE: CPT | Performed by: INTERNAL MEDICINE

## 2017-10-08 PROCEDURE — 94640 AIRWAY INHALATION TREATMENT: CPT

## 2017-10-08 PROCEDURE — 40000916 ZZH STATISTIC SITTER, NIGHT HOURS

## 2017-10-08 PROCEDURE — 25000128 H RX IP 250 OP 636: Performed by: INTERNAL MEDICINE

## 2017-10-08 RX ADMIN — ACETAMINOPHEN 1000 MG: 500 TABLET, FILM COATED ORAL at 20:00

## 2017-10-08 RX ADMIN — ACETAMINOPHEN 650 MG: 325 TABLET, FILM COATED ORAL at 01:28

## 2017-10-08 RX ADMIN — IPRATROPIUM BROMIDE AND ALBUTEROL SULFATE 3 ML: .5; 3 SOLUTION RESPIRATORY (INHALATION) at 20:05

## 2017-10-08 RX ADMIN — ACETAMINOPHEN 1000 MG: 500 TABLET, FILM COATED ORAL at 10:05

## 2017-10-08 RX ADMIN — ASPIRIN 325 MG ORAL TABLET 325 MG: 325 PILL ORAL at 08:49

## 2017-10-08 RX ADMIN — IPRATROPIUM BROMIDE AND ALBUTEROL SULFATE 3 ML: .5; 3 SOLUTION RESPIRATORY (INHALATION) at 15:44

## 2017-10-08 RX ADMIN — ENOXAPARIN SODIUM 40 MG: 40 INJECTION SUBCUTANEOUS at 20:00

## 2017-10-08 RX ADMIN — ALLOPURINOL 100 MG: 100 TABLET ORAL at 08:50

## 2017-10-08 RX ADMIN — DOCUSATE SODIUM 200 MG: 100 CAPSULE, LIQUID FILLED ORAL at 08:50

## 2017-10-08 RX ADMIN — CETIRIZINE HYDROCHLORIDE 10 MG: 10 TABLET, FILM COATED ORAL at 08:49

## 2017-10-08 RX ADMIN — MINOCYCLINE HYDROCHLORIDE 100 MG: 100 CAPSULE ORAL at 08:50

## 2017-10-08 RX ADMIN — LEVOTHYROXINE SODIUM 50 MCG: 25 TABLET ORAL at 08:49

## 2017-10-08 RX ADMIN — LEVOFLOXACIN 500 MG: 500 TABLET, FILM COATED ORAL at 08:50

## 2017-10-08 RX ADMIN — TAMSULOSIN HYDROCHLORIDE 0.4 MG: 0.4 CAPSULE ORAL at 20:00

## 2017-10-08 RX ADMIN — PANTOPRAZOLE SODIUM 40 MG: 40 TABLET, DELAYED RELEASE ORAL at 08:50

## 2017-10-08 RX ADMIN — ALLOPURINOL 100 MG: 100 TABLET ORAL at 20:00

## 2017-10-08 RX ADMIN — FUROSEMIDE 40 MG: 40 TABLET ORAL at 08:50

## 2017-10-08 RX ADMIN — SENNOSIDES AND DOCUSATE SODIUM 1 TABLET: 8.6; 5 TABLET ORAL at 08:50

## 2017-10-08 RX ADMIN — MINOCYCLINE HYDROCHLORIDE 100 MG: 100 CAPSULE ORAL at 20:00

## 2017-10-08 RX ADMIN — IPRATROPIUM BROMIDE AND ALBUTEROL SULFATE 3 ML: .5; 3 SOLUTION RESPIRATORY (INHALATION) at 07:23

## 2017-10-08 RX ADMIN — POTASSIUM CHLORIDE 10 MEQ: 750 TABLET, FILM COATED, EXTENDED RELEASE ORAL at 08:50

## 2017-10-08 NOTE — PLAN OF CARE
Problem: Patient Care Overview  Goal: Plan of Care/Patient Progress Review  Outcome: No Change     Dx: CHF vs PNA  Hx: Down Syndrome, Chronic LE Edema, and HTN  Assessment: VSS. Nonverbal. No IV Access. A2 w/ lift. O2 delivered via Cool Aerosol. Dusky discoloration d/t minocycline. PSC @ bedside throughout shift.   Plan: Continue POC. D/C Monday back to group home on hospice.

## 2017-10-08 NOTE — PLAN OF CARE
All VSS, LS clear and satting in the high 80's to mid 90's on blow by O2.  Moves with A2 and a lift.  IV antibiotics switched to PO this shift, tele has been discontinued.  Held senna this shift.  Plan is to go to group home on hospice on Monday.  Will continue with POC.

## 2017-10-08 NOTE — PROGRESS NOTES
[facility]  Hospitalist Progress Note  Patient Name: Jerald Alonzo    MRN: [id number]  Provider: Jany Mills MD    Initial presenting complaint/issue to hospital (Diagnosis): shortness of breath         Assessment and Plan:      Summary of Stay:  Jerald Alonzo is a 66 year old male with Down's syndrome and severe cognitive delay.  He presented to the ED from his group home on 9/29/2017 with increased work of breathing for the preceding week. He was noted to have bilateral infiltrates assoc with hypoxia.  He was admitted for treatment.  He was started on diuretics for presumed CHF. However, Echo did not suggest LV failure (neither systolic or diastolic) but did show severe pulmonary hypertension and right sided failure. It was suspected that severe pulmonary hypertension may have been the cause of his hypoxia as he is felt to likely have untreated FROYLAN. He was initially treated with supplemental O2, nebs, and a trial of steroids. He has been afebrile but pro-calcitonin was mildly elevated.  LE US was negative. TTE did suggest right sided heart failure as noted above. CT pulmonary angiogram was obtained on 10/5 with some light sedation to expand his workup and he did appear to have some ground glass infiltrates with small effusions suggestive of pneumonia. He was started on IV levaquin.     It remains to be seen how much Blayne will improve. He currently is requiring high rate blow by O2.  Palliative care has been consulted.His hypoxia may improve with treatment for pneumonia but it seems likely that he has severe pulmonary HTN/untreated FROYLAN (certainly could not tolerate cpap, family agrees).  After discussion with Palliative Care and Blayne's family it was decided to pursue hospice care.  Blayne cannot return to the group home over the weekend.       Problem List:     1.  Acute hypoxic resp failure due to severe pulmonary HTN, untreated FROYLAN and suspected pneumonia. \  --ECHO shows possible  "early diastolic dysfunction and preserved LV EF, with normal appearing valves.   --WBC normal, and procal 0.42, no fever but ground glass on CT scan so treat empirically for pneumonia.  CTPA negative for PE.  --Continue nebs, daily prednisone given some CO2 retention (4-5 day burst planned).  --CT showed ground glass w/ small effusions so plan to treat for pneumonia (abx started 10/5) w/ levaquin. Will continue abx to complte course of treatment  --Vitals stable today     2.  Severe pulmonary hypertension: Likely due to FROYLAN, but also possibly worse due to current possible pneumonia.  --May tolerate oxygen blow-by at bedtime, if this is covered by insurance and can be done at his home.  CTPA negative for PE.     3.  Chronic LE edema. Appears very well-managed with compression.      4.  Down's syndrome w/ severe cognitive impairment, intolerant to basic medical treatments such as IV lines, oximeters, nasal cannulas and certainly would not tolerate cpap/bipap.  Family initially decided to enter him in hospice. He has many siblings involved in his care. His brother is now reconsidering the decision to discharge to hospice.  He cannot return to group home over the weekend. Will discuss with palliative care tomorrow prior to discharge.    DVT Prophylaxis: Enoxaparin (Lovenox) SQ  Code Status: DNR / DNI  Discharge Dispo: back to group home Monday with hospice.        Interval History:      Patient seen and examined. He stated that he is feeling better. He has no nausea or vomiting.                   Physical Exam:      Last Vital Signs:  /47 (BP Location: Left arm)  Pulse 101  Temp 96.7  F (35.9  C) (Axillary)  Resp 18  Ht 1.473 m (4' 9.99\")  Wt 82 kg (180 lb 11.2 oz)  SpO2 92%  BMI 37.78 kg/m2    Intake/Output Summary (Last 24 hours) at 10/07/17 1915  Last data filed at 10/07/17 1800   Gross per 24 hour   Intake             1342 ml   Output              725 ml   Net              617 ml       GENERAL:  " Comfortable. Unable to answer questions.   PSYCH: No acute distress.  EYES: PERRLA, Normal conjunctiva.  HEART:  Regular rate and rhythm. No JVD. Pulses normal. No edema.  LUNGS:  Clear to auscultation, normal Respiratory effort.  ABDOMEN:  Soft, no hepatosplenomegaly, normal bowel sounds.  EXTREMETIES: No clubbing, cyanosis or ischemia  SKIN:  Dry to touch, No rash.           Medications:      All current medications were reviewed.         Data:      All new lab and imaging data was reviewed.   Labs:  No results for input(s): CULT in the last 168 hours.       Lab Results   Component Value Date     10/03/2017     10/02/2017     10/01/2017    Lab Results   Component Value Date    CHLORIDE 106 10/03/2017    CHLORIDE 104 10/02/2017    CHLORIDE 104 10/01/2017    Lab Results   Component Value Date    BUN 18 10/03/2017    BUN 20 10/02/2017    BUN 25 10/01/2017      Lab Results   Component Value Date    POTASSIUM 4.8 10/03/2017    POTASSIUM 4.6 10/02/2017    POTASSIUM 4.4 10/01/2017    Lab Results   Component Value Date    CO2 33 10/03/2017    CO2 31 10/02/2017    CO2 33 10/01/2017    Lab Results   Component Value Date    CR 0.82 10/03/2017    CR 0.86 10/02/2017    CR 1.03 10/01/2017          Recent Labs  Lab 10/06/17  0655 10/03/17  0653    269

## 2017-10-08 NOTE — PLAN OF CARE
Problem: Patient Care Overview  Goal: Plan of Care/Patient Progress Review  Outcome: No Change  Pain: No c/o pain  LOC: Hx Down's syndrome. Confused/forgetful.  Restless at times.  Sitter at bedside.  Mobility: Assist of 2 and walker. Family says he can walk independently with a walker at baseline. Worked with P.T. today to stand, pivot, walk.  Pt needs much encouragement to do cares/mobility with a greater degree of independence.  Lungs: RUBI, diminished.  92% on room air  Tele: SR  GI: low fat low chol diet.  Tray set up.  Needs assist with eating, but can feed himself some finger foods.  (According to family, pt feeds himself all types of food at baseline. He was able to use a spoon/fork by himself at his group home)  : Incont at times.  Will sometimes use urinal with assist.  IV: PIV saline locked  Other: Dark, suad L/E at baseline.  Excoriation inner thighs and buttocks, right ankle. Leg wraps in place.  Continue antibiotic, lasix, prednisone per orders.  Palliative following.  D/C to group home when ready

## 2017-10-08 NOTE — PLAN OF CARE
Problem: Patient Care Overview  Goal: Plan of Care/Patient Progress Review  Discharge Planner PT      PT: Orders received, chart reviewed, eval completed, and treatment initiated. Pt is 67 yo male with Down's syndrome and severe cognitive delay. Pt presented to the ED from his group home on 9/29/2017 with complaints of increased difficulty with breathing the preceding week. Pt was found to have acute hypoxic respiratory failure due to severe pulmonary hypertension, untreated obstructive sleep apnea, and supspected pneumonia. Pt has chronic LE edema and osteoarthritis. Pt was previously living in a group home and was able to ambulate short distances with fww. Per chart family considering home with hospice, however pt has been demonstrating improved independence with mobility and unsure of group home's ability to provide more than assist of 1.     Patient plan for discharge: Current plan is back to group home   Current status: Pt demonstrating variable abilities for independence with mobility due to participation. Pt demonstrating ability to transfer sit to/from stand with CGA. Requires max A x2 for sit to supine and scooting in bed. Pt then demonstrates ability to transition into long sitting in bed with no assistance. Pt will continue to require encouragement to improve participation with mobility.   Barriers to return to prior living situation: Level of assist, participation  Recommendations for discharge: Home with Home PT pending pts continued functional progress and level of assist group home is able to provide.   Rationale for recommendations: Pt will benefit from continued PT intervention to improved his independence with transfers and ambulation to met max functional potential prior to and following discharge pending families decision about pursing hospice.        Entered by: Ashley Mendez 10/08/2017 12:46 PM

## 2017-10-08 NOTE — PROGRESS NOTES
10/08/17 1157   Quick Adds   Type of Visit Initial PT Evaluation   Living Environment   Lives With other (see comments)  (group home)   Living Arrangements house   Home Accessibility no concerns   Living Environment Comment Pt was previously a resident of a group home   Self-Care   Usual Activity Tolerance moderate   Current Activity Tolerance fair   Equipment Currently Used at Home walker, rolling   Functional Level Prior   Ambulation 3-->assistive equipment and person   Transferring 3-->assistive equipment and person   Toileting 3-->assistive equipment and person   Bathing 3-->assistive equipment and person   Dressing 3-->assistive equipment and person   Eating 0-->independent   Communication 2-->difficulty understanding and speaking (not related to language barrier)   Swallowing 0-->swallows foods/liquids without difficulty   Cognition 2 - difficulty with organizing thoughts   Fall history within last six months no   Which of the above functional risks had a recent onset or change? ambulation;transferring   Prior Functional Level Comment Pt was previously ambulatory with fww for short distances.    General Information   Onset of Illness/Injury or Date of Surgery - Date 09/29/17   Referring Physician Dr. Venkat Agustin   Patient/Family Goals Statement To return to his group home   Pertinent History of Current Problem (include personal factors and/or comorbidities that impact the POC) Pt is 65 yo male with Down's syndrome and severe cognitive delay.  Pt presented to the ED from his group home on 9/29/2017 with complaints of increased difficulty with breathing for the preceding week. Pt was found to have acute hypoxic respiratory failure due to severe pulmonary hypertension, untreated obstructive sleep apnea, and supspected pneumonia. Pt has chronic LE edema and osteoarthritis.    Precautions/Limitations fall precautions   Weight-Bearing Status - LLE full weight-bearing   Weight-Bearing Status - RLE full  weight-bearing   General Info Comments Per notes, family is considering hospice care upon discharge. However, RN stating that family hasn't decided and pt has been improving more than expected and family considering no hospice.    Cognitive Status Examination   Orientation orientation to person, place and time   Level of Consciousness alert   Follows Commands and Answers Questions 25% of the time;unable to answer questions;able to follow single-step instructions   Personal Safety and Judgment impulsive;at risk behaviors demonstrated   Memory impaired   Posture    Posture Forward head position;Protracted shoulders   Range of Motion (ROM)   ROM Comment Pt is generally hypermobile, B feet deformities   Strength   Strength Comments Generally demonstrates 4/5    Bed Mobility   Bed Mobility Comments Pt requires max A for sit to supine    Transfer Skills   Transfer Comments Pt transfers sit to/from stand with CGA   Gait   Gait Comments Pt ambulates 1ft with fww and CGA   Balance   Balance Comments good seated, fair standing   General Therapy Interventions   Planned Therapy Interventions bed mobility training;gait training;strengthening;neuromuscular re-education;transfer training   Clinical Impression   Criteria for Skilled Therapeutic Intervention yes, treatment indicated   PT Diagnosis Difficulty with gait   Influenced by the following impairments Pt presents with decreased strength, impaired balance, impaired functional capacity   Functional limitations due to impairments Pt demonstrates increased difficulty with bed mobility, transfers, and ambulation   Clinical Presentation Evolving/Changing   Clinical Presentation Rationale clinical observation   Clinical Decision Making (Complexity) Low complexity   Therapy Frequency` daily   Predicted Duration of Therapy Intervention (days/wks) 3 days   Anticipated Discharge Disposition Home with Home Therapy   Risk & Benefits of therapy have been explained Yes   Patient, Family &  "other staff in agreement with plan of care Yes   [facility] TM \"6 Clicks\"   2016, Trustees of [facility], under license to [email or web address].  All rights reserved.   6 Clicks Short Forms Basic Mobility Inpatient Short Form   [facility]  \"6 Clicks\" V.2 Basic Mobility Inpatient Short Form   1. Turning from your back to your side while in a flat bed without using bedrails? 3 - A Little   2. Moving from lying on your back to sitting on the side of a flat bed without using bedrails? 2 - A Lot   3. Moving to and from a bed to a chair (including a wheelchair)? 3 - A Little   4. Standing up from a chair using your arms (e.g., wheelchair, or bedside chair)? 3 - A Little   5. To walk in hospital room? 2 - A Lot   6. Climbing 3-5 steps with a railing? 1 - Total   Basic Mobility Raw Score (Score out of 24.Lower scores equate to lower levels of function) 14   Total Evaluation Time   Total Evaluation Time (Minutes) 15     "

## 2017-10-08 NOTE — PROGRESS NOTES
[facility]  Hospitalist Progress Note  Patient Name: Jerald Alonzo    MRN: [id number]  Provider: Venkat Agustin MD  10/07/17    Initial presenting complaint/issue to hospital (Diagnosis): shortness of breath         Assessment and Plan:      Summary of Stay:  Jerald Alonzo is a 66 year old male with Down's syndrome and severe cognitive delay.  He presented to the ED from his group home on 9/29/2017 with increased work of breathing for the preceding week. He was noted to have bilateral infiltrates assoc with hypoxia.  He was admitted for treatment.  He was started on diuretics for presumed CHF. However, Echo did not suggest LV failure (neither systolic or diastolic) but did show severe pulmonary hypertension and right sided failure.  It was suspected that severe pulmonary hypertension may have been the cause of his hypoxia as he is felt to likely have untreated FROYLAN.  He was initially treated with supplemental O2, nebs, and a trial of steroids.  He has been afebrile but pro-calcitonin was mildly elevated.  LE US was negative.  TTE did suggest right sided heart failure as noted above.  CT pulmonary angiogram was obtained on 10/5 with some light sedation to expand his workup and he did appear to have some ground glass infiltrates with small effusions suggestive of pneumonia.  He was started on IV levaquin.     It remains to be seen how much Blayne will improve.  He currently is requiring high rate blow by O2.  Palliative care has been consulted.  His hypoxia may improve with treatment for pneumonia but it seems likely that he has severe pulmonary HTN/untreated FROYLAN (certainly could not tolerate cpap, family agrees).  After discussion with Palliative Care and Blayne's family it was decided to pursue hospice care.  Blayne cannot return to the group home over the weekend.        Problem List:     1.  Acute hypoxic resp failure due to severe pulmonary HTN, untreated FROYLAN and suspected pneumonia.  Pt's  "echo shows possible early Diastolic dysfunction and preserved LV EF, with normal appearing valves. Infectious eval showed WBC normal, and procal 0.42, no fever but ground glass on CT scan so treat empirically for pneumonia as therapeutic options are limited. D-dimer noted to be elevated but LE US was negative and CTPA negative for PE.  Also, it is suspected that much of what we are seeing is chronic.  --continue nebs, daily prednisone given some CO2 retention (4-5 day burst planned).  --rechecked, procal slightly higher.  CT showed ground glass w/ small effusions so plan to treat for pneumonia (abx started 10/5) w/ levaquin  --monitor for fever     2.  Severe pulmonary hypertension: Likely due to FROYLAN, but also possibly worse due to current possible pneumonia.  It is likely that this accounts for much of pt's hypoxia. I am not convinced that further diuretics will help much. I suspect that he has quite severe FROYLAN, and may tolerate oxygen blow-by at bedtime, if this is covered by insurance and can be done at his home.  CTPA negative for PE.     3.  Chronic LE edema. Appears very well-managed with compression.      4.  Down's syndrome w/ severe cognitive impairment, intolerant to basic medical treatments such as IV lines, oximeters, nasal cannulas and certainly would not tolerate cpap/bipap.  Blayne's family has decided to enter him in hospice.  He cannot return to group home over the weekend.         DVT Prophylaxis: Enoxaparin (Lovenox) SQ  Code Status: DNR / DNI  Discharge Dispo: back to group home Monday with hospice.        Interval History:      Patient seems stable.  Unable to really voice specific problems.  Does not appear to be in pain.                   Physical Exam:      Last Vital Signs:  /52 (BP Location: Left arm)  Pulse 76  Temp 99.2  F (37.3  C) (Oral)  Resp 22  Ht 1.473 m (4' 9.99\")  Wt 83.8 kg (184 lb 12.8 oz)  SpO2 94%  BMI 38.63 kg/m2    Intake/Output Summary (Last 24 hours) at " 10/07/17 1915  Last data filed at 10/07/17 1800   Gross per 24 hour   Intake             1342 ml   Output              725 ml   Net              617 ml       GENERAL:  Comfortable. Unable to answer questions.   PSYCH: No acute distress.  EYES: PERRLA, Normal conjunctiva.  HEART:  Regular rate and rhythm. No JVD. Pulses normal. No edema.  LUNGS:  Clear to auscultation, normal Respiratory effort.  ABDOMEN:  Soft, no hepatosplenomegaly, normal bowel sounds.  EXTREMETIES: No clubbing, cyanosis or ischemia  SKIN:  Dry to touch, No rash.           Medications:      All current medications were reviewed.         Data:      All new lab and imaging data was reviewed.   Labs:  No results for input(s): CULT in the last 168 hours.       Lab Results   Component Value Date     10/03/2017     10/02/2017     10/01/2017    Lab Results   Component Value Date    CHLORIDE 106 10/03/2017    CHLORIDE 104 10/02/2017    CHLORIDE 104 10/01/2017    Lab Results   Component Value Date    BUN 18 10/03/2017    BUN 20 10/02/2017    BUN 25 10/01/2017      Lab Results   Component Value Date    POTASSIUM 4.8 10/03/2017    POTASSIUM 4.6 10/02/2017    POTASSIUM 4.4 10/01/2017    Lab Results   Component Value Date    CO2 33 10/03/2017    CO2 31 10/02/2017    CO2 33 10/01/2017    Lab Results   Component Value Date    CR 0.82 10/03/2017    CR 0.86 10/02/2017    CR 1.03 10/01/2017          Recent Labs  Lab 10/06/17  0655 10/03/17  0653 10/01/17  0640   WBC  --   --  8.7   HGB  --   --  9.9*   HCT  --   --  32.7*   MCV  --   --  99    269 240

## 2017-10-09 LAB
CREAT SERPL-MCNC: 0.96 MG/DL (ref 0.66–1.25)
GFR SERPL CREATININE-BSD FRML MDRD: 79 ML/MIN/1.7M2
PLATELET # BLD AUTO: 270 10E9/L (ref 150–450)

## 2017-10-09 PROCEDURE — A9270 NON-COVERED ITEM OR SERVICE: HCPCS | Mod: GY | Performed by: INTERNAL MEDICINE

## 2017-10-09 PROCEDURE — 36415 COLL VENOUS BLD VENIPUNCTURE: CPT | Performed by: INTERNAL MEDICINE

## 2017-10-09 PROCEDURE — 25000132 ZZH RX MED GY IP 250 OP 250 PS 637: Mod: GY | Performed by: INTERNAL MEDICINE

## 2017-10-09 PROCEDURE — 25000125 ZZHC RX 250: Performed by: INTERNAL MEDICINE

## 2017-10-09 PROCEDURE — 40000275 ZZH STATISTIC RCP TIME EA 10 MIN

## 2017-10-09 PROCEDURE — 99232 SBSQ HOSP IP/OBS MODERATE 35: CPT | Performed by: INTERNAL MEDICINE

## 2017-10-09 PROCEDURE — 94640 AIRWAY INHALATION TREATMENT: CPT | Mod: 76

## 2017-10-09 PROCEDURE — 25000128 H RX IP 250 OP 636: Performed by: INTERNAL MEDICINE

## 2017-10-09 PROCEDURE — 99207 ZZC CDG-MDM COMPONENT: MEETS LOW - DOWN CODED: CPT | Performed by: INTERNAL MEDICINE

## 2017-10-09 PROCEDURE — 82565 ASSAY OF CREATININE: CPT | Performed by: INTERNAL MEDICINE

## 2017-10-09 PROCEDURE — 40000916 ZZH STATISTIC SITTER, NIGHT HOURS

## 2017-10-09 PROCEDURE — 12000000 ZZH R&B MED SURG/OB

## 2017-10-09 PROCEDURE — 85049 AUTOMATED PLATELET COUNT: CPT | Performed by: INTERNAL MEDICINE

## 2017-10-09 PROCEDURE — 94640 AIRWAY INHALATION TREATMENT: CPT

## 2017-10-09 PROCEDURE — 40000915 ZZH STATISTIC SITTER, EVENING HOURS

## 2017-10-09 RX ADMIN — ASPIRIN 325 MG ORAL TABLET 325 MG: 325 PILL ORAL at 08:16

## 2017-10-09 RX ADMIN — ENOXAPARIN SODIUM 40 MG: 40 INJECTION SUBCUTANEOUS at 20:38

## 2017-10-09 RX ADMIN — SENNOSIDES AND DOCUSATE SODIUM 1 TABLET: 8.6; 5 TABLET ORAL at 08:16

## 2017-10-09 RX ADMIN — ALLOPURINOL 100 MG: 100 TABLET ORAL at 08:16

## 2017-10-09 RX ADMIN — ACETAMINOPHEN 1000 MG: 500 TABLET, FILM COATED ORAL at 08:17

## 2017-10-09 RX ADMIN — FUROSEMIDE 40 MG: 40 TABLET ORAL at 08:17

## 2017-10-09 RX ADMIN — DOCUSATE SODIUM 200 MG: 100 CAPSULE, LIQUID FILLED ORAL at 08:16

## 2017-10-09 RX ADMIN — TAMSULOSIN HYDROCHLORIDE 0.4 MG: 0.4 CAPSULE ORAL at 20:38

## 2017-10-09 RX ADMIN — SENNOSIDES AND DOCUSATE SODIUM 1 TABLET: 8.6; 5 TABLET ORAL at 20:37

## 2017-10-09 RX ADMIN — LEVOFLOXACIN 500 MG: 500 TABLET, FILM COATED ORAL at 08:16

## 2017-10-09 RX ADMIN — ALLOPURINOL 100 MG: 100 TABLET ORAL at 20:37

## 2017-10-09 RX ADMIN — POTASSIUM CHLORIDE 10 MEQ: 750 TABLET, FILM COATED, EXTENDED RELEASE ORAL at 08:16

## 2017-10-09 RX ADMIN — PANTOPRAZOLE SODIUM 40 MG: 40 TABLET, DELAYED RELEASE ORAL at 08:17

## 2017-10-09 RX ADMIN — ACETAMINOPHEN 1000 MG: 500 TABLET, FILM COATED ORAL at 20:38

## 2017-10-09 RX ADMIN — IPRATROPIUM BROMIDE AND ALBUTEROL SULFATE 3 ML: .5; 3 SOLUTION RESPIRATORY (INHALATION) at 07:32

## 2017-10-09 RX ADMIN — IPRATROPIUM BROMIDE AND ALBUTEROL SULFATE 3 ML: .5; 3 SOLUTION RESPIRATORY (INHALATION) at 11:41

## 2017-10-09 RX ADMIN — MINOCYCLINE HYDROCHLORIDE 100 MG: 100 CAPSULE ORAL at 20:37

## 2017-10-09 RX ADMIN — IPRATROPIUM BROMIDE AND ALBUTEROL SULFATE 3 ML: .5; 3 SOLUTION RESPIRATORY (INHALATION) at 19:59

## 2017-10-09 RX ADMIN — IPRATROPIUM BROMIDE AND ALBUTEROL SULFATE 3 ML: .5; 3 SOLUTION RESPIRATORY (INHALATION) at 16:29

## 2017-10-09 RX ADMIN — CETIRIZINE HYDROCHLORIDE 10 MG: 10 TABLET, FILM COATED ORAL at 08:17

## 2017-10-09 RX ADMIN — MINOCYCLINE HYDROCHLORIDE 100 MG: 100 CAPSULE ORAL at 08:17

## 2017-10-09 RX ADMIN — LEVOTHYROXINE SODIUM 50 MCG: 25 TABLET ORAL at 08:17

## 2017-10-09 NOTE — PLAN OF CARE
Problem: Patient Care Overview  Goal: Plan of Care/Patient Progress Review  Outcome: Improving     Dx: CHF vs PNA  Hx: Down Syndrome, Chronic LE Edema, and HTN  Assessment: VSS. Nonverbal. No IV Access. A2 w/ walker. Pt on RA. Dusky discoloration d/t minocycline. PSC @ bedside throughout shift. LS: Diminished. Scrotal/coccyx wound open to air/cleaned w/ soup and water. Scratched on upper bilateral thighs open to air.   Plan: Continue POC. D/C today back to group home possibly on hospice w/ PT.

## 2017-10-09 NOTE — PROGRESS NOTES
SWS:  SW received message that family has decided against hospice at [place]. SW spoke with sister Audra Palacio and she states that since pt is no longer on oxygen that family feels that hospice is not appropriate.  Audra Palacio states that she left a message with the  requesting that pt return to his original . NOE spoke with nurse Manrique at the  and they plan to have a care conference with pt family tomorrow here at the hospital at 11:00am and anticipate that pt will return to  after the care conference.

## 2017-10-09 NOTE — PLAN OF CARE
Problem: Cardiac: Heart Failure (Adult)  Goal: Signs and Symptoms of Listed Potential Problems Will be Absent, Minimized or Managed (Cardiac: Heart Failure)  Signs and symptoms of listed potential problems will be absent, minimized or managed by discharge/transition of care (reference Cardiac: Heart Failure (Adult) CPG).   Outcome: Improving  VSS, afeb., LS are dim, 93% on RA. Denies pain, pt mobility improving, up to BR w assist of 2 and walker. Pt had good appetite, ate 100% and fed himself.  Wounds to coccyx and scrotum cleansed and barrier cream applied.Pos dc to Group home tomorrow.

## 2017-10-09 NOTE — PROGRESS NOTES
[facility]  Hospitalist Progress Note  Patient Name: Jerald Alonzo    MRN: [id number]  Provider: Jany Mills MD    Initial presenting complaint/issue to hospital (Diagnosis): shortness of breath         Assessment and Plan:      Summary of Stay:  Jerald Alonzo is a 66 year old male with Down's syndrome and severe cognitive delay.  He presented to the ED from his group home on 9/29/2017 with increased work of breathing for the preceding week. He was noted to have bilateral infiltrates assoc with hypoxia.  He was admitted for treatment.  He was started on diuretics for presumed CHF. However, Echo did not suggest LV failure (neither systolic or diastolic) but did show severe pulmonary hypertension and right sided failure. It was suspected that severe pulmonary hypertension may have been the cause of his hypoxia as he is felt to likely have untreated FROYLAN. He was initially treated with supplemental O2, nebs, and a trial of steroids. He has been afebrile but pro-calcitonin was mildly elevated.  LE US was negative. TTE did suggest right sided heart failure as noted above. CT pulmonary angiogram was obtained on 10/5 with some light sedation to expand his workup and he did appear to have some ground glass infiltrates with small effusions suggestive of pneumonia. He was started on IV levaquin.  Palliative care has been consulted during this admission. Initially family wanted hospice care on discharge but later changed their decision as the patient improved. Patient is now off oxygen. Plan is to discharge back to longterm tomorrow.       Problem List:     1.  Acute hypoxic resp failure due to severe pulmonary HTN, untreated FROYLAN and suspected pneumonia. \  --ECHO shows possible early diastolic dysfunction and preserved LV EF, with normal appearing valves.   --WBC normal, and procal 0.42, no fever but ground glass on CT scan so treat empirically for pneumonia.  CTPA negative for PE.  --Continue  "nebs, daily prednisone  --CT showed ground glass w/ small effusions so plan to treat for pneumonia (abx started 10/5) w/ levaquin. Will continue abx to complte course of treatment  --Vitals stable today     2.  Severe pulmonary hypertension: Likely due to FROYLAN, but also possibly worse due to current possible pneumonia.  --CT of the chest is negative for PE.  --Needs outpatient follow up ad evaluation for sleep apnea.      3.  Chronic LE edema. Appears very well-managed with compression.      4.  Down's syndrome w/ severe cognitive impairment, intolerant to basic medical treatments such as IV lines, oximeters, nasal cannulas and certainly would not tolerate cpap/bipap. Family initially decided to enter him in hospice. He has many siblings involved in his care. His brother is now reconsidering the decision to discharge to hospice. He cannot return to group home over the weekend. Will discuss with palliative care tomorrow prior to discharge.    DVT Prophylaxis: Enoxaparin (Lovenox) SQ  Code Status: DNR / DNI  Discharge Dispo: back to group home tomorrow         Interval History:      Patient seen and examined.unable to provide history due to his condition but able to show signs that he is feeling better by showing thumbs up. Breathing stable.                   Physical Exam:      Last Vital Signs:  /68 (BP Location: Left arm)  Pulse 101  Temp 96.9  F (36.1  C) (Axillary)  Resp 16  Ht 1.473 m (4' 9.99\")  Wt 82 kg (180 lb 11.2 oz)  SpO2 94%  BMI 37.78 kg/m2    Intake/Output Summary (Last 24 hours) at 10/07/17 1915  Last data filed at 10/07/17 1800   Gross per 24 hour   Intake             1342 ml   Output              725 ml   Net              617 ml       GENERAL:  Comfortable. Unable to answer questions.   PSYCH: No acute distress.  EYES: PERRLA, Normal conjunctiva.  HEART:  Regular rate and rhythm. No JVD. Pulses normal. No edema.  LUNGS:  Clear to auscultation, normal Respiratory effort.  ABDOMEN:  Soft, " no hepatosplenomegaly, normal bowel sounds.  EXTREMETIES: No clubbing, cyanosis or ischemia  SKIN:  Dry to touch, No rash.           Medications:      All current medications were reviewed.         Data:      All new lab and imaging data was reviewed.   Labs:  No results for input(s): CULT in the last 168 hours.       Lab Results   Component Value Date     10/03/2017     10/02/2017     10/01/2017    Lab Results   Component Value Date    CHLORIDE 106 10/03/2017    CHLORIDE 104 10/02/2017    CHLORIDE 104 10/01/2017    Lab Results   Component Value Date    BUN 18 10/03/2017    BUN 20 10/02/2017    BUN 25 10/01/2017      Lab Results   Component Value Date    POTASSIUM 4.8 10/03/2017    POTASSIUM 4.6 10/02/2017    POTASSIUM 4.4 10/01/2017    Lab Results   Component Value Date    CO2 33 10/03/2017    CO2 31 10/02/2017    CO2 33 10/01/2017    Lab Results   Component Value Date    CR 0.82 10/03/2017    CR 0.86 10/02/2017    CR 1.03 10/01/2017          Recent Labs  Lab 10/09/17  0610 10/06/17  0655 10/03/17  0653    281 269

## 2017-10-09 NOTE — PLAN OF CARE
"Problem: Pneumonia (Adult)  Goal: Signs and Symptoms of Listed Potential Problems Will be Absent, Minimized or Managed (Pneumonia)  Signs and symptoms of listed potential problems will be absent, minimized or managed by discharge/transition of care (reference Pneumonia (Adult) CPG).   Outcome: Therapy, progress toward functional goals is gradual  /68 (BP Location: Left arm)  Pulse 101  Temp 96.9  F (36.1  C) (Axillary)  Resp 16  Ht 1.473 m (4' 9.99\")  Wt 82 kg (180 lb 11.2 oz)  SpO2 94%  BMI 37.78 kg/m2  PNA: scheduled nebs, PO Prednisone, PO Levaquin. No supplemental oxygen in place. 92-96% RA. Up with assist x1 and walker. DC back to group home tomorrow.       "

## 2017-10-10 VITALS
OXYGEN SATURATION: 94 % | TEMPERATURE: 97.4 F | WEIGHT: 180.7 LBS | BODY MASS INDEX: 35.47 KG/M2 | RESPIRATION RATE: 18 BRPM | HEIGHT: 60 IN | DIASTOLIC BLOOD PRESSURE: 53 MMHG | SYSTOLIC BLOOD PRESSURE: 104 MMHG | HEART RATE: 71 BPM

## 2017-10-10 PROCEDURE — 90662 IIV NO PRSV INCREASED AG IM: CPT | Performed by: INTERNAL MEDICINE

## 2017-10-10 PROCEDURE — 90732 PPSV23 VACC 2 YRS+ SUBQ/IM: CPT | Performed by: INTERNAL MEDICINE

## 2017-10-10 PROCEDURE — 99233 SBSQ HOSP IP/OBS HIGH 50: CPT | Performed by: CLINICAL NURSE SPECIALIST

## 2017-10-10 PROCEDURE — A9270 NON-COVERED ITEM OR SERVICE: HCPCS | Mod: GY | Performed by: INTERNAL MEDICINE

## 2017-10-10 PROCEDURE — 25000125 ZZHC RX 250: Performed by: INTERNAL MEDICINE

## 2017-10-10 PROCEDURE — 99239 HOSP IP/OBS DSCHRG MGMT >30: CPT | Performed by: INTERNAL MEDICINE

## 2017-10-10 PROCEDURE — 25000132 ZZH RX MED GY IP 250 OP 250 PS 637: Mod: GY | Performed by: INTERNAL MEDICINE

## 2017-10-10 PROCEDURE — 94640 AIRWAY INHALATION TREATMENT: CPT

## 2017-10-10 PROCEDURE — 25000128 H RX IP 250 OP 636: Performed by: INTERNAL MEDICINE

## 2017-10-10 PROCEDURE — 40000275 ZZH STATISTIC RCP TIME EA 10 MIN

## 2017-10-10 RX ORDER — LEVOFLOXACIN 500 MG/1
500 TABLET, FILM COATED ORAL DAILY
Qty: 2 TABLET | Refills: 0 | Status: SHIPPED | OUTPATIENT
Start: 2017-10-10 | End: 2017-10-12

## 2017-10-10 RX ADMIN — ACETAMINOPHEN 1000 MG: 500 TABLET, FILM COATED ORAL at 09:09

## 2017-10-10 RX ADMIN — ASPIRIN 325 MG ORAL TABLET 325 MG: 325 PILL ORAL at 09:07

## 2017-10-10 RX ADMIN — PANTOPRAZOLE SODIUM 40 MG: 40 TABLET, DELAYED RELEASE ORAL at 09:09

## 2017-10-10 RX ADMIN — DOCUSATE SODIUM 200 MG: 100 CAPSULE, LIQUID FILLED ORAL at 09:08

## 2017-10-10 RX ADMIN — ALLOPURINOL 100 MG: 100 TABLET ORAL at 09:09

## 2017-10-10 RX ADMIN — POTASSIUM CHLORIDE 10 MEQ: 750 TABLET, FILM COATED, EXTENDED RELEASE ORAL at 09:08

## 2017-10-10 RX ADMIN — IPRATROPIUM BROMIDE AND ALBUTEROL SULFATE 3 ML: .5; 3 SOLUTION RESPIRATORY (INHALATION) at 08:28

## 2017-10-10 RX ADMIN — ACETAMINOPHEN 650 MG: 325 TABLET, FILM COATED ORAL at 02:15

## 2017-10-10 RX ADMIN — PNEUMOCOCCAL VACCINE POLYVALENT 0.5 ML
25; 25; 25; 25; 25; 25; 25; 25; 25; 25; 25; 25; 25; 25; 25; 25; 25; 25; 25; 25; 25; 25; 25 INJECTION, SOLUTION INTRAMUSCULAR; SUBCUTANEOUS at 12:19

## 2017-10-10 RX ADMIN — FUROSEMIDE 40 MG: 40 TABLET ORAL at 09:10

## 2017-10-10 RX ADMIN — LEVOTHYROXINE SODIUM 50 MCG: 25 TABLET ORAL at 09:07

## 2017-10-10 RX ADMIN — MINOCYCLINE HYDROCHLORIDE 100 MG: 100 CAPSULE ORAL at 09:07

## 2017-10-10 RX ADMIN — LEVOFLOXACIN 500 MG: 500 TABLET, FILM COATED ORAL at 09:07

## 2017-10-10 RX ADMIN — INFLUENZA A VIRUSA/MICHIGAN/45/2015 X-275 (H1N1) ANTIGEN (FORMALDEHYDE INACTIVATED), INFLUENZA A VIRUS A/HONG KONG/4801/2014 X-263B (H3N2) ANTIGEN (FORMALDEHYDE INACTIVATED), AND INFLUENZA B VIRUS B/BRISBANE/60/2008 ANTIGEN (FORMALDEHYDE INACTIVATED) 0.5 ML: 60; 60; 60 INJECTION, SUSPENSION INTRAMUSCULAR at 12:18

## 2017-10-10 RX ADMIN — CETIRIZINE HYDROCHLORIDE 10 MG: 10 TABLET, FILM COATED ORAL at 09:08

## 2017-10-10 ASSESSMENT — ACTIVITIES OF DAILY LIVING (ADL): ADLS_ACUITY_SCORE: 32

## 2017-10-10 NOTE — PLAN OF CARE
Problem: Patient Care Overview  Goal: Plan of Care/Patient Progress Review  Outcome: No Change  Pain: No c/o pain  LOC: Hx Down's syndrome. Confused/forgetful.  Restless at times.  Sitter at bedside.  Mobility: Assist of 1 and walker.   Lungs: RUBI, diminished.  92% on room air  Tele: SR  GI: low fat low chol diet.  Tray set up.  Needs assist with eating.  : Incont at times.   IV: PIV saline locked  Other: Dark, suad L/E at baseline.  Excoriation inner thighs and buttocks, right ankle. Leg wraps in place.  Continue antibiotic, lasix, prednisone per orders.  Palliative following.  D/C to group home when ready

## 2017-10-10 NOTE — DISCHARGE SUMMARY
[facility]    Discharge Summary  Hospitalist    Date of Admission:  9/29/2017  Date of Discharge:  10/10/2017 12:47 PM  Discharging Provider: Jany Mills MD  Date of Service (when I saw the patient): 10/10/17    Discharge Diagnoses      1.  Acute hypoxic resp failure due to severe pulmonary HTN, untreated FROYLAN and suspected pneumonia.       2.  Severe pulmonary hypertension      3.  Chronic LE edema      4.  Down's syndrome w/ severe cognitive impairment, intolerant to basic medical treatments such as IV lines, oximeters, nasal cannulas and certainly would not tolerate cpap/bipap     History of Present Illness   Jerald Alonzo is an 67 year old male who presented with     Hospital Course   Summary of Stay:  Jerald Alonzo is a 66 year old male with Down's syndrome and severe cognitive delay.  He presented to the ED from his group home on 9/29/2017 with increased work of breathing for the preceding week. He was noted to have bilateral infiltrates assoc with hypoxia.  He was admitted for treatment.  He was started on diuretics for presumed CHF. However, Echo did not suggest LV failure (neither systolic or diastolic) but did show severe pulmonary hypertension and right sided failure. It was suspected that severe pulmonary hypertension may have been the cause of his hypoxia as he is felt to likely have untreated FROYLAN. He was initially treated with supplemental O2, nebs, and a trial of steroids. He has been afebrile but pro-calcitonin was mildly elevated.  LE US was negative. TTE did suggest right sided heart failure as noted above. CT pulmonary angiogram was obtained on 10/5 with some light sedation to expand his workup and he did appear to have some ground glass infiltrates with small effusions suggestive of pneumonia. He was started on IV levaquin.Palliative care has been consulted during this admission. Initially family wanted hospice care on discharge but later changed their  decision as the patient improved. Patient is now off oxygen.       Problem List:      1.  Acute hypoxic resp failure due to severe pulmonary HTN, untreated FROYLAN and suspected pneumonia.   ECHO shows possible early diastolic dysfunction and preserved LV EF, with normal appearing valves.  WBC normal, and procal 0.42, no fever but ground glass on CT scan so treat empirically for pneumonia. CTPA negative for PE. Continued  nebs, daily prednisone. CT showed ground glass w/ small effusions so plan to treat for pneumonia (abx started 10/5) w/ levaquin. Continued on abx to complete course of treatment. He was discharged on oral Levaquin.       2.  Severe pulmonary hypertension: Likely due to FROYALN, but also possibly worse due to current possible pneumonia. CT of the chest is negative for PE. He was recommended outpatient follow up and evaluation for sleep apnea.       3.  Chronic LE edema. Appears very well-managed with compression.       4.  Down's syndrome w/ severe cognitive impairment, intolerant to basic medical treatments such as IV lines, oximeters, nasal cannulas and certainly would not tolerate cpap/bipap. Family initially decided to enter him in hospice and later changed their decision due to improvement of patient's condition. He has many siblings involved in his care. Patient was discharged back to group home. Patient remained stable during hospital course. Oxygen saturation remained stable off oxygen supplement. He was discharged back to group home in a stable condition.       Significant Results and Procedures   Results for orders placed or performed during the hospital encounter of 09/29/17   XR Chest Port 1 View    Narrative    PORTABLE CHEST ONE VIEW   9/29/2017 9:53 PM     HISTORY: Shortness of breath.    COMPARISON: 10/18/2006.    FINDINGS: Spinal fixation hardware mid thoracic region to the  visualized portion of the lumbar spine, new since the prior study.  There is pulmonary vascular congestion which may  be related to the  patient's supine positioning. There is interstitial perihilar opacity,  new since the prior exam and there is more localized patchy opacity in  the right midlung laterally. Findings may represent edema and right  mid lung consolidation or atelectasis.      Impression    IMPRESSION:   1. New interstitial perihilar opacities not present on 10/18/2006.  This could represent CHF.  2. There is some localized opacity in the right midlung which may be  consolidation or atelectasis.    CHACHO HLOLOWAY MD   US Lower Extremity Venous Bilateral Port    Narrative    US LOWER EXTREMITY VENOUS DUPLEX BILATERAL PORT   9/30/2017 1:34 PM     HISTORY: Shortness of breath. Evaluate for deep venous thrombosis.    COMPARISON: None.    TECHNIQUE: Spectral doppler and waveform analysis were performed on  the bilateral lower extremity veins.    FINDINGS: The bilateral common femoral, femoral, and popliteal veins  are patent, compressible, and negative for deep venous thrombosis.  Calf veins are segmentally seen but appear negative for DVT where  visualized.      Impression    IMPRESSION:  No evidence for deep venous thrombosis in the bilateral  lower extremity veins.    PEARL CHAVEZ MD   XR Chest Port 1 View    Narrative    XR CHEST PORT 1 VW 10/3/2017 9:42 AM    HISTORY: Worsening respiratory status.    COMPARISON: 9/29/2017    FINDINGS: Interstitial edema and bilateral atelectasis are not  significantly changed from 9/29/2017. No pneumothorax. Stable heart  size.      Impression    IMPRESSION: No significant change from 9/29/2017.    NATALIA COSBY MD   CT Chest Pulmonary Embolism w Contrast    Narrative    CT CHEST PULMONARY EMBOLISM WITH CONTRAST October 5, 2017 11:09 AM     HISTORY: Rule out pulmonary embolus, evaluate for fibrotic changes or  infectious appearing infiltrate.     TECHNIQUE: Thin section axial images are performed from the thoracic  inlet to the lung bases utilizing 70 mL of Isovue 370 IV  contrast  without adverse event. Coronal reformatted images are also generated.  Radiation dose for this scan was reduced using automated exposure  control, adjustment of the mA and/or kV according to patient size, or  iterative reconstruction technique.    FINDINGS:     Chest: Areas of infiltrate and consolidation are noted in the lower  lobes in addition to bandlike areas of atelectasis in the lingula and  right upper lobe. Pneumonia is possible. Trace pleural effusions are  noted bilaterally. No pericardial fluid. Heart is normal in size. The  esophagus is unremarkable. Thyroid gland appears prominent in size. No  enlarged axillary lymph nodes. Probable reactive adenopathy in the  mediastinum and hilar regions. A prominent right tracheoesophageal  lymph node measures 2.1 x 1.4 cm on series 4, image 32 in the right  posterior upper mediastinum. No prior exam is available to assess for  chronicity. Remaining lymph nodes are smaller in size. A left  supraclavicular lymph node is also present measuring 1.5 cm on series  4, image 10. No evidence of pulmonary embolism however the study is  limited due to patient respiratory motion. Thoracic aorta is  unremarkable. Limited images upper abdomen demonstrate elevation of  the right hemidiaphragm. Bone window examination demonstrates  degenerative spine changes with spinal fusion from approximately T6  through the upper lumbar spine.      Impression    IMPRESSION:  1. Bibasilar infiltrates and right lower lobe consolidation with trace  pleural effusions. Pneumonia is possible. No evidence of pulmonary  embolism. Thoracic aorta is unremarkable.  2. Probable reactive lymph nodes with enlarged upper mediastinal node  in the tracheoesophageal region and also prominent left  supraclavicular lymph node. Remaining lymph nodes are mildly prominent  but do not exceed size criteria for abnormality.  3. Extensive spinal fusion.    KIARRA BARRON MD         Pending Results   None  Code  Status   Full Code       Primary Care Physician   Orestes Werner        Discharge Disposition   Discharged to home  Condition at discharge: Stable    Consultations This Hospital Stay   RESPIRATORY CARE IP CONSULT  CARE COORDINATOR IP CONSULT  SPEECH LANGUAGE PATH ADULT IP CONSULT  PALLIATIVE CARE ADULT IP CONSULT  WOUND OSTOMY CONTINENCE NURSE  IP CONSULT  PHYSICAL THERAPY ADULT IP CONSULT    Time Spent on this Encounter   JAKY, Jany Mills MD, personally saw the patient today and spent greater than 30 minutes discharging this patient.    Discharge Orders     Reason for your hospital stay   pneumonia     Follow-up and recommended labs and tests    Follow up with primary care provider, Orestes Werner, within 7 days     Activity   Your activity upon discharge: activity as tolerated     Diet   Follow this diet upon discharge: Orders Placed This Encounter     Room Service     Combination Diet Low Saturated Fat Na <2400mg Diet, No Caffeine Diet       Discharge Medications   Discharge Medication List as of 10/10/2017 12:28 PM      CONTINUE these medications which have CHANGED    Details   levofloxacin (LEVAQUIN) 500 MG tablet Take 1 tablet (500 mg) by mouth daily for 2 days, Disp-2 tablet, R-0, E-Prescribe         CONTINUE these medications which have NOT CHANGED    Details   acetaminophen (TYLENOL) 500 MG tablet Take 1,000 mg by mouth 2 times daily, Historical      emollient (VANICREAM) cream Apply topically 2 times dailyHistorical      allopurinol (ZYLOPRIM) 100 MG tablet Take 100 mg by mouth 2 times daily, Historical      !! furosemide (LASIX) 20 MG tablet Take 20 mg by mouth In afternoon, Historical      acetic acid (VOSOL) 2 % otic solution Place 5 drops into both ears once a week On Thursday, Historical      levothyroxine (SYNTHROID/LEVOTHROID) 50 MCG tablet Take 50 mcg by mouth daily, Historical      !! furosemide (LASIX) 40 MG tablet Take 40 mg by mouth every morning, Historical      potassium  chloride SA (K-DUR/KLOR-CON M) 10 MEQ CR tablet Take 10 mEq by mouth daily, Historical      multivitamin, therapeutic (THERA-VIT) TABS tablet Take 1 tablet by mouth daily, Historical      cholecalciferol (VITAMIN D) 400 UNIT TABS tablet Take 800 Units by mouth daily, Historical      cetirizine (ZYRTEC) 10 MG tablet Take 10 mg by mouth daily, Historical      docusate sodium (COLACE) 100 MG capsule Take 200 mg by mouth every morning , Historical      minocycline (MINOCIN,DYNACIN) 100 MG capsule Take 100 mg by mouth 2 times daily., 100 mg, Oral, 2 TIMES DAILY, Until Discontinued, Historical      tamsulosin (FLOMAX) 0.4 MG 24 hr capsule Take 0.4 mg by mouth daily., 0.4 mg, Oral, DAILY, Until Discontinued, Historical      NEXIUM 40 MG OR CPDR 1 CAPSULE DAILY, Oral, Historical      ACETAMINOPHEN 500 MG OR CAPS w tablets orall as needed for pain maximum 4000mg in 24 hours, Historical      AMOXICILLIN 500 MG OR TABS take 4 capsules by mouth 1 hour before dental appointments( 2000mg), Historical      TEA TREE OIL apply thin layer to toenails as needed, Historical       !! - Potential duplicate medications found. Please discuss with provider.        Allergies   Allergies   Allergen Reactions     Ertapenem Swelling     Data   Most Recent 3 CBC's:  Recent Labs   Lab Test  10/09/17   0610  10/06/17   0655  10/03/17   0653  10/01/17   0640  09/30/17   0713  09/29/17   2124   WBC   --    --    --   8.7  8.7  10.0   HGB   --    --    --   9.9*  10.0*  10.9*   MCV   --    --    --   99  100  96   PLT  270  281  269  240  246  266      Most Recent 3 BMP's:  Recent Labs   Lab Test  10/09/17   0610  10/08/17   0800  10/03/17   0653  10/02/17   0629  10/01/17   0640   NA   --    --   140  140  140   POTASSIUM   --    --   4.8  4.6  4.4   CHLORIDE   --    --   106  104  104   CO2   --    --   33*  31  33*   BUN   --    --   18  20  25   CR  0.96  0.92  0.82  0.86  1.03   ANIONGAP   --    --   1*  5  3   PATRICIA   --    --   8.1*  8.0*  7.7*    GLC   --    --   79  87  74     Most Recent 2 LFT's:  Recent Labs   Lab Test  10/01/17   0640  09/29/17 2124   AST  22  26   ALT  24  28   ALKPHOS  100  124   BILITOTAL  0.2  0.2     Most Recent INR's and Anticoagulation Dosing History:  Anticoagulation Dose History     There is no flowsheet data to display.        Most Recent 3 Troponin's:  Recent Labs   Lab Test  09/30/17   0713  09/30/17   0015  09/29/17 2124   TROPI  0.048*  0.092*  0.115*     Most Recent Cholesterol Panel:No lab results found.  Most Recent 6 Bacteria Isolates From Any Culture (See EPIC Reports for Culture Details):No lab results found.  Most Recent TSH, T4 and A1c Labs:No lab results found.

## 2017-10-10 NOTE — PROCEDURES
Interpretation of Nocturnal Recording Oximetry for study night of: 10/04/2017.   Study running from 1200 to 1880   (See attached graphics in paper chart)   This study was:   1. Cool aerosol 15 % [place]   2. Abnormal study   Findings Include:   1. Bradycardia   2. Tachycardia   3. Pulse mean 77, range  bpm. 21% of tracing saturations were below 90%, mostly before oxygen was increased to 15 liters.   4. Multiple desaturations below 90%   5. Awake sats were documented: with very little sleep. No asleep saturations documented.   6. No sleep was observed by staff   Suggested Follow Up:   1. Clinical correlation for causes of waking hypoxemia and abnormal heart rate   2. Other:  77% on room air awake at start of recording.      Other: This is a waking oximetry. No conclusions can be drawn about sleep.         MENDEZ FERNANDEZ MD             D: 10/10/2017 15:25   T: 10/10/2017 15:43   MT:       Name:     BETTY SALINAS   MRN:      [id number]        Account:        [id number]   :      1950           Procedure Date: 10/04/2017      Document: [id number]

## 2017-10-10 NOTE — PROGRESS NOTES
/[facility]  Palliative Care Progress Note  Text Page    Care conference in the third floor conference room with the patient's guardian/sister Audra Anand; brother/guardian Timbo Alonzo; Irvin Rader  JAY Pal; four team members from [facility] (group home staff);  Shiloh Guerrero RN and  JAY Cisneros. Reviewed goals of care. Audra Palacio and Timbo confirmed that the 5 guardians request DNR/DNI and comfort plan, and agree with the DNR/DNI Protocol for Jerald Alonzo drafted 11/24/2015. There was hesitation from 2 guardians about using hospice as the patient is now off oxygen. Reviewed results of 9/30/2017 Echo demonstrating severe (>55 mmHg) pulmonary hypertension to explain Dev's chronic terminal pathology. Dev's LPN explained that using hospice would be helpful in providing care to Dev in alignment with the families request for a comfort plan. Family agrees that using [facility] would be helpful. Dev's LPN will call [facility] to set up a meeting next Friday with guardians and Dev's group home. Dev will dismiss to the group home in [place] as all his personal belongings have been moved to the [place] group [facility].      Assessment & Plan   1. Dyspnea - continue with current interventions.      2. Goals of Care: DNR/DNI - Comfort care. The patient's sister Audra Anand and brother Timbo Alonzo have spoken with the other 3 siblings/guardians and request a comfort plan.    Pia Mayes MS, RN, CNS, APRN, ACHPN, FAACVPR  Pain and Palliative Care  Pager [phone]  Office [phone]       Time Spent on this Encounter   I spent from 11 AM until noon in assessment of the patient and discussion with the family and group home team. Another 20 minutes in review of chart, documentation and discussion with the health care team.    Interval History   Chart reviewed     Review of Systems     Patient happily coloring  pictures and smiled, but does not give verbal responses other than grunts and moans    Physical Exam   Temp:  [96.9  F (36.1  C)-97.6  F (36.4  C)] 97.4  F (36.3  C)  Pulse:  [71-77] 71  Heart Rate:  [79-84] 84  Resp:  [16-20] 18  BP: ()/(53-68) 104/53  SpO2:  [92 %-98 %] 94 %  180 lbs 11.2 oz  GEN:  Alert, seems to respond to his name and appears comfortable.  RESP:  Symmetric chest rise on inhalation noted.  Normal respiratory effort.    Medications        ipratropium - albuterol 0.5 mg/2.5 mg/3 mL  3 mL Nebulization Q4H WA     levofloxacin  500 mg Oral Daily     enoxaparin  40 mg Subcutaneous Q24H     furosemide  40 mg Oral Daily     acetaminophen  1,000 mg Oral BID     allopurinol  100 mg Oral BID     cetirizine  10 mg Oral Daily     docusate sodium  200 mg Oral QAM     levothyroxine  50 mcg Oral Daily     minocycline  100 mg Oral BID     pantoprazole  40 mg Oral QAM AC     potassium chloride  10 mEq Oral Daily     tamsulosin  0.4 mg Oral QPM     aspirin  325 mg Oral Daily     senna-docusate  1-2 tablet Oral BID       Data   No results found for this or any previous visit (from the past 24 hour(s)).

## 2017-10-10 NOTE — PROGRESS NOTES
Hand-off for Care Transitions to Next Level of Care Provider  Name: Jerald Alonzo  MRN #: [id number]  Reason for Hospitalization:  Acute systolic congestive heart failure (H) [I50.21]  Admit Date/Time: 9/29/2017  7:51 PM  Discharge Date: 10/10    Reason for Communication Hand-off Referral: Admission diagnoses: CHF    Discharge Plan:  Discharged to: Home with homecare                      Key Recommendations:  Pt was admitted with SOB, CHF and severe pulmonary HTN and untreated FROYLAN. He was treated with diuretics although his severe pulmonary HTN was felt to have contributed to his hypoxia. He was treated also with O2, nebs and a trial of steroids. He developed PNA and was on IV abx. Palliative care was involved with his 5 guardians. He was dc'd back to his group home with plans for pt to start hospice.     He has a new POLST given to [facility] and guardians.    Shiloh Guerrero RN CTS    Handoff was sent via [email or web address] to cts for pcp

## 2017-10-10 NOTE — PLAN OF CARE
Problem: Patient Care Overview  Goal: Plan of Care/Patient Progress Review  Outcome: No Change  Non-verbal.  Up in chair overnight.  Cries out often, seemingly in pain.  No IV access; Tylenol in applesauce given, seemed to help him get some sleep. Plans to d/c today.  It is his birthday, day shift RN sang for him, he seemed to enjoy it.

## 2017-10-10 NOTE — PLAN OF CARE
Physical Therapy Discharge Summary    Reason for therapy discharge:    Discharged to Group Home with Hospice    Progress towards therapy goal(s). See goals on Care Plan in [facility] electronic health record for goal details.  Goals partially met.  Barriers to achieving goals:   limited tolerance for therapy and discharge from facility.    Therapy recommendation(s):    No further therapy is recommended.

## 2025-06-25 NOTE — PROGRESS NOTES
Respiratory Therapy Note        Pt will not keep any oxygen on, SpO2 on room air marizol as low as 80%.  Attempting to use blow by O2 10 Lpm 100%; Spo2 90%.    October 3, 2017 4:47 PM  Kaden Rader       Opt out